# Patient Record
Sex: FEMALE | Race: WHITE | Employment: FULL TIME | URBAN - METROPOLITAN AREA
[De-identification: names, ages, dates, MRNs, and addresses within clinical notes are randomized per-mention and may not be internally consistent; named-entity substitution may affect disease eponyms.]

---

## 2017-03-10 ENCOUNTER — OFFICE VISIT (OUTPATIENT)
Dept: SURGERY | Age: 62
End: 2017-03-10

## 2017-03-10 ENCOUNTER — TELEPHONE (OUTPATIENT)
Dept: SURGERY | Age: 62
End: 2017-03-10

## 2017-03-10 ENCOUNTER — DOCUMENTATION ONLY (OUTPATIENT)
Dept: SURGERY | Age: 62
End: 2017-03-10

## 2017-03-10 VITALS
WEIGHT: 131 LBS | HEART RATE: 71 BPM | SYSTOLIC BLOOD PRESSURE: 122 MMHG | BODY MASS INDEX: 21.05 KG/M2 | DIASTOLIC BLOOD PRESSURE: 61 MMHG | HEIGHT: 66 IN

## 2017-03-10 DIAGNOSIS — N60.12 FIBROCYSTIC BREAST CHANGES OF BOTH BREASTS: Primary | ICD-10-CM

## 2017-03-10 DIAGNOSIS — Z80.3 FAMILY HISTORY OF BREAST CANCER: ICD-10-CM

## 2017-03-10 DIAGNOSIS — N60.11 FIBROCYSTIC BREAST CHANGES OF BOTH BREASTS: Primary | ICD-10-CM

## 2017-03-10 NOTE — PROGRESS NOTES
HISTORY OF PRESENT ILLNESS  Ulises Irby is a 64 y.o. female.   HPI       ROS    Physical Exam    ASSESSMENT and PLAN  {ASSESSMENT/PLAN:19842}

## 2017-03-10 NOTE — PATIENT INSTRUCTIONS
Breast Self-Exam: Care Instructions  Your Care Instructions  A breast self-exam is when you check your breasts for lumps or changes. This regular exam helps you learn how your breasts normally look and feel. Most breast problems or changes are not because of cancer. Breast self-exam is not a substitute for a mammogram. Having regular breast exams by your doctor and regular mammograms improve your chances of finding any problems with your breasts. Some women set a time each month to do a step-by-step breast self-exam. Other women like a less formal system. They might look at their breasts as they brush their teeth, or feel their breasts once in a while in the shower. If you notice a change in your breast, tell your doctor. Follow-up care is a key part of your treatment and safety. Be sure to make and go to all appointments, and call your doctor if you are having problems. Its also a good idea to know your test results and keep a list of the medicines you take. How do you do a breast self-exam?  · The best time to examine your breasts is usually one week after your menstrual period begins. Your breasts should not be tender then. If you do not have periods, you might do your exam on a day of the month that is easy to remember. · To examine your breasts:  ¨ Remove all your clothes above the waist and lie down. When you are lying down, your breast tissue spreads evenly over your chest wall, which makes it easier to feel all your breast tissue. ¨ Use the pads--not the fingertips--of the 3 middle fingers of your left hand to check your right breast. Move your fingers slowly in small coin-sized circles that overlap. ¨ Use three levels of pressure to feel of all your breast tissue. Use light pressure to feel the tissue close to the skin surface. Use medium pressure to feel a little deeper. Use firm pressure to feel your tissue close to your breastbone and ribs.  Use each pressure level to feel your breast tissue before moving on to the next spot. ¨ Check your entire breast, moving up and down as if following a strip from the collarbone to the bra line, and from the armpit to the ribs. Repeat until you have covered the entire breast.  ¨ Repeat this procedure for your left breast, using the pads of the 3 middle fingers of your right hand. · To examine your breasts while in the shower:  ¨ Place one arm over your head and lightly soap your breast on that side. ¨ Using the pads of your fingers, gently move your hand over your breast (in the strip pattern described above), feeling carefully for any lumps or changes. ¨ Repeat for the other breast.  · Have your doctor inspect anything you notice to see if you need further testing. Where can you learn more? Go to http://bambi-gabby.info/. Enter P148 in the search box to learn more about \"Breast Self-Exam: Care Instructions. \"  Current as of: July 26, 2016  Content Version: 11.1  © 6484-6241 [employer]. Care instructions adapted under license by [employer] (which disclaims liability or warranty for this information). If you have questions about a medical condition or this instruction, always ask your healthcare professional. [place] [address] any warranty or liability for your use of this information.

## 2017-03-10 NOTE — PROGRESS NOTES
HISTORY OF PRESENT ILLNESS  Vianca Velez is a 64 y.o. female. HPI  ESTABLISHED patient here today for high risk follow up due to her family history of breast cancer. Denies any breast problems at this time. Patient has had numerous bilateral breast biopsies, all benign (including a right breast fibroadenoma). OB History      Para Term  AB TAB SAB Ectopic Multiple Living    3                 Obstetric Comments    Menarche:14  . LMP: 36.  # of Children:  3. Age at Delivery of First Child:  22.   Hysterectomy/oophorectomy:  YES/NO. Breast Bx:  yes. Hx of Breast Feeding:  yes. BCP:  yes. Hormone therapy:  no.        Past Surgical History:   Procedure Laterality Date    BREAST SURGERY PROCEDURE UNLISTED      fibroadadenoma    HX HYSTERECTOMY      HX TUBAL LIGATION         FH includes-  Sister diagnosed age 43 with breast cancer  at 50  Sister diagnosed with breast cancer age 54, still living (her daughter had genetic testing, which was negative). 3/10/17 - Valeri Bow model - Her 10 year risk is 15.5% (compared with a population risk of 3.6%). Her lifetime risk is 34% (compared with a population risk of 8.7%)    Recent imaging-  Bilateral screening mammogram on 16- BIRADS 2  Breast MRI on 3/8/16- BIRADS 2  ROS    Physical Exam   Constitutional: She is oriented to person, place, and time. She appears well-developed and well-nourished. Pulmonary/Chest: Right breast exhibits skin change (well healed surgical scar). Right breast exhibits no inverted nipple, no mass, no nipple discharge and no tenderness. Left breast exhibits no inverted nipple, no mass, no nipple discharge, no skin change and no tenderness. Breasts are symmetrical.   Musculoskeletal: Normal range of motion. UE x 2   Lymphadenopathy:     She has no cervical adenopathy. She has no axillary adenopathy. Right: No supraclavicular adenopathy present. Left: No supraclavicular adenopathy present. Neurological: She is alert and oriented to person, place, and time. Skin: Skin is warm, dry and intact. Chest and breasts examined   Psychiatric: She has a normal mood and affect. Her speech is normal and behavior is normal.     Visit Vitals    /61    Pulse 71    Ht 5' 6\" (1.676 m)    Wt 131 lb (59.4 kg)    BMI 21.14 kg/m2     ASSESSMENT and PLAN  Encounter Diagnoses   Name Primary?  Fibrocystic breast changes of both breasts Yes    Family history of breast cancer      Normal exam and imaging. We discussed her family history and risk of breast cancer. The patient's risk of breast cancer was calculated using the [address] [place] [address] Street. Her 10 year risk is 15.5% (compared with a population risk of 3.6%). Her lifetime risk is 34% (compared with a population risk of 8.7%). She will continue to have annual mammograms and an annual screening MRI. She will have a CBE with her OB/PCP and will be seen here yearly. She is due for her mammogram and MRI and those were ordered today. Risk reduction with tamoxifen was previously discussed with the patient by Dr. Bianca Briseno and she will not pursue that at this time. We also reviewed genetic testing possibilities focusing on breast cancer causing genes. We discussed possible results (positive for a mutation, variant of unknown significance and negative for a mutation), reliability of these results, what these results mean in terms of her personal risk of breast, ovarian and other cancers, treatment to decrease her risk and/or increased monitoring for cancer detection, effect these results would have on family members and the logistics of testing. All the patient's questions and concerns were addressed and she elected to proceed with genetic testing. We will contact her with the testing results when available.

## 2017-03-10 NOTE — TELEPHONE ENCOUNTER
Please schedule for BSmammogram and screening MRI. High risk patient. Would like mammo and MRI on the same day if possible.

## 2017-03-14 NOTE — TELEPHONE ENCOUNTER
4/18/2017 1400 - 60 min [facility] MRI 1 (Resource) [facility] [employer]

## 2017-03-15 ENCOUNTER — TELEPHONE (OUTPATIENT)
Dept: SURGERY | Age: 62
End: 2017-03-15

## 2017-04-10 DIAGNOSIS — N60.19: Primary | ICD-10-CM

## 2017-04-18 ENCOUNTER — HOSPITAL ENCOUNTER (OUTPATIENT)
Dept: MRI IMAGING | Age: 62
Discharge: HOME | End: 2017-04-18
Attending: NURSE PRACTITIONER
Payer: COMMERCIAL

## 2017-04-18 ENCOUNTER — HOSPITAL ENCOUNTER (OUTPATIENT)
Dept: MAMMOGRAPHY | Age: 62
Discharge: HOME | End: 2017-04-18
Attending: NURSE PRACTITIONER
Payer: COMMERCIAL

## 2017-04-18 DIAGNOSIS — N60.12 FIBROCYSTIC BREAST CHANGES OF BOTH BREASTS: ICD-10-CM

## 2017-04-18 DIAGNOSIS — N60.11 FIBROCYSTIC BREAST CHANGES OF BOTH BREASTS: ICD-10-CM

## 2017-04-18 DIAGNOSIS — Z80.3 FAMILY HISTORY OF BREAST CANCER: ICD-10-CM

## 2017-04-18 PROCEDURE — 74011250636 HC RX REV CODE- 250/636: Performed by: NURSE PRACTITIONER

## 2017-04-18 PROCEDURE — 77067 SCR MAMMO BI INCL CAD: CPT

## 2017-04-18 PROCEDURE — A9585 GADOBUTROL INJECTION: HCPCS | Performed by: NURSE PRACTITIONER

## 2017-04-18 PROCEDURE — 77059 MRI BREAST BI W WO CONT: CPT

## 2017-04-18 RX ADMIN — GADOBUTROL 6 ML: 604.72 INJECTION INTRAVENOUS at 14:13

## 2017-04-19 ENCOUNTER — TELEPHONE (OUTPATIENT)
Dept: SURGERY | Age: 62
End: 2017-04-19

## 2017-04-19 DIAGNOSIS — R92.8 ABNORMAL FINDING ON BREAST IMAGING: Primary | ICD-10-CM

## 2017-04-19 NOTE — TELEPHONE ENCOUNTER
Spoke with patient on the phone. Mammogram was normal, but MRI showed an area of concern on the left breast.  Biopsy recommended and patient amenable to this. Will have office reach out to schedule MRI guided biopsy. Patient will call the office if she has further questions or concerns. Please schedule for left MRI guided biopsy.

## 2017-05-01 ENCOUNTER — TELEPHONE (OUTPATIENT)
Dept: SURGERY | Age: 62
End: 2017-05-01

## 2017-05-01 NOTE — TELEPHONE ENCOUNTER
Received fax from Johnnie stating, again, that sample was insufficient. I called patient. She states she sent in her home sample last week. I told her the information I received stated that it was insufficient. The next option is for her to come in for blood draw. She is ok with this. However, she states she will be out of town for a few days/weeks. I told her to call our office to schedule an appointment for nurse visit. She typically comes to [place]- I told her we are there on fridays. She states understanding. She will call our office when she is ready to schedule. I fax this update back to [facility].

## 2017-05-04 ENCOUNTER — HOSPITAL ENCOUNTER (OUTPATIENT)
Dept: MRI IMAGING | Age: 62
Discharge: HOME | End: 2017-05-04
Attending: NURSE PRACTITIONER
Payer: COMMERCIAL

## 2017-05-04 ENCOUNTER — HOSPITAL ENCOUNTER (OUTPATIENT)
Dept: MAMMOGRAPHY | Age: 62
Discharge: HOME | End: 2017-05-04
Payer: COMMERCIAL

## 2017-05-04 VITALS — BODY MASS INDEX: 21.63 KG/M2 | WEIGHT: 134 LBS

## 2017-05-04 DIAGNOSIS — R92.8 ABNORMAL MRI, BREAST: ICD-10-CM

## 2017-05-04 DIAGNOSIS — R92.8 ABNORMAL FINDING ON BREAST IMAGING: ICD-10-CM

## 2017-05-04 PROCEDURE — 77065 DX MAMMO INCL CAD UNI: CPT

## 2017-05-04 PROCEDURE — A9585 GADOBUTROL INJECTION: HCPCS | Performed by: NURSE PRACTITIONER

## 2017-05-04 PROCEDURE — 74011000250 HC RX REV CODE- 250: Performed by: RADIOLOGY

## 2017-05-04 PROCEDURE — 19085 BX BREAST 1ST LESION MR IMAG: CPT

## 2017-05-04 PROCEDURE — 88342 IMHCHEM/IMCYTCHM 1ST ANTB: CPT | Performed by: RADIOLOGY

## 2017-05-04 PROCEDURE — 74011250636 HC RX REV CODE- 250/636: Performed by: NURSE PRACTITIONER

## 2017-05-04 PROCEDURE — 88305 TISSUE EXAM BY PATHOLOGIST: CPT | Performed by: RADIOLOGY

## 2017-05-04 RX ORDER — LIDOCAINE HYDROCHLORIDE AND EPINEPHRINE 10; 10 MG/ML; UG/ML
10 INJECTION, SOLUTION INFILTRATION; PERINEURAL ONCE
Status: COMPLETED | OUTPATIENT
Start: 2017-05-04 | End: 2017-05-04

## 2017-05-04 RX ORDER — LIDOCAINE HYDROCHLORIDE 10 MG/ML
2 INJECTION INFILTRATION; PERINEURAL ONCE
Status: COMPLETED | OUTPATIENT
Start: 2017-05-04 | End: 2017-05-04

## 2017-05-04 RX ADMIN — LIDOCAINE HYDROCHLORIDE 2 ML: 10 INJECTION, SOLUTION INFILTRATION; PERINEURAL at 13:58

## 2017-05-04 RX ADMIN — GADOBUTROL 6 ML: 604.72 INJECTION INTRAVENOUS at 14:32

## 2017-05-04 RX ADMIN — LIDOCAINE HYDROCHLORIDE,EPINEPHRINE BITARTRATE 100 MG: 10; .01 INJECTION, SOLUTION INFILTRATION; PERINEURAL at 13:58

## 2017-05-04 NOTE — PROGRESS NOTES
1255- IV SL established without problem. Pt tolerated well. Resting on stretcher. 36- Dr. Christine Gunn speaking with pt and answering questions. 1330- Pt amb to MRI room for positioning and pre-procedural scanning. 1358- Time out done. Pt procedure confirmed. Dr. Christine Gunn begins invasive portion of left breast MRI guided biopsy. 1430- Biopsy complete. Pt tolerated procedure well. Specimens obtained and labeled accordingly. Pressure held to biopsy site immediately. 1435- Pt moved to stretcher in recovery area. No active bleeding noted. Resting comfortably. 1440- Pt to mammo area for clip films. 1450- Pt returns. IV D/C'd intact without problem. Bx site dressed with steri-strip, telfa, and hypafix. 6\" ace wrap snugly to chest with ice pack over bx site. D/C instructions reviewed with pt and copy given. Verbalized her understanding. D/C'd amb, nico, NAD. Specimens prepped for  p/u.

## 2017-05-08 ENCOUNTER — TELEPHONE (OUTPATIENT)
Dept: SURGERY | Age: 62
End: 2017-05-08

## 2017-05-08 NOTE — TELEPHONE ENCOUNTER
Spoke with patient on the phone and reviewed pathology - atypia. Will have the office reach out to her to schedule her a presurgical appointment with Dr. Flaca Lindsey. She will also have her blood drawn for genetic testing at that time as her pervious saliva samples have not be adequate for testing. She will contact the office if she has additional questions or concerns.

## 2017-05-09 ENCOUNTER — DOCUMENTATION ONLY (OUTPATIENT)
Dept: SURGERY | Age: 62
End: 2017-05-09

## 2017-05-11 ENCOUNTER — DOCUMENTATION ONLY (OUTPATIENT)
Dept: SURGERY | Age: 62
End: 2017-05-11

## 2017-05-11 NOTE — PROGRESS NOTES
Faxed form to [facility] regarding update with patient coming in on 5/15/17 for blood draw for genetic testing. Confirmation fax received.

## 2017-05-15 ENCOUNTER — OFFICE VISIT (OUTPATIENT)
Dept: SURGERY | Age: 62
End: 2017-05-15

## 2017-05-15 ENCOUNTER — TELEPHONE (OUTPATIENT)
Dept: SURGERY | Age: 62
End: 2017-05-15

## 2017-05-15 VITALS
SYSTOLIC BLOOD PRESSURE: 124 MMHG | BODY MASS INDEX: 21.53 KG/M2 | WEIGHT: 134 LBS | HEIGHT: 66 IN | DIASTOLIC BLOOD PRESSURE: 59 MMHG | HEART RATE: 61 BPM

## 2017-05-15 DIAGNOSIS — Z80.3 FAMILY HISTORY OF BREAST CANCER: ICD-10-CM

## 2017-05-15 DIAGNOSIS — N60.92 ATYPICAL DUCTAL HYPERPLASIA OF LEFT BREAST: Primary | ICD-10-CM

## 2017-05-15 PROBLEM — Z91.89 AT HIGH RISK FOR BREAST CANCER: Status: ACTIVE | Noted: 2017-05-15

## 2017-05-15 RX ORDER — LORATADINE 10 MG/1
10 TABLET ORAL
COMMUNITY

## 2017-05-15 NOTE — PATIENT INSTRUCTIONS
Breast Self-Exam: Care Instructions  Your Care Instructions  A breast self-exam is when you check your breasts for lumps or changes. This regular exam helps you learn how your breasts normally look and feel. Most breast problems or changes are not because of cancer. Breast self-exam is not a substitute for a mammogram. Having regular breast exams by your doctor and regular mammograms improve your chances of finding any problems with your breasts. Some women set a time each month to do a step-by-step breast self-exam. Other women like a less formal system. They might look at their breasts as they brush their teeth, or feel their breasts once in a while in the shower. If you notice a change in your breast, tell your doctor. Follow-up care is a key part of your treatment and safety. Be sure to make and go to all appointments, and call your doctor if you are having problems. Its also a good idea to know your test results and keep a list of the medicines you take. How do you do a breast self-exam?  · The best time to examine your breasts is usually one week after your menstrual period begins. Your breasts should not be tender then. If you do not have periods, you might do your exam on a day of the month that is easy to remember. · To examine your breasts:  ¨ Remove all your clothes above the waist and lie down. When you are lying down, your breast tissue spreads evenly over your chest wall, which makes it easier to feel all your breast tissue. ¨ Use the pads--not the fingertips--of the 3 middle fingers of your left hand to check your right breast. Move your fingers slowly in small coin-sized circles that overlap. ¨ Use three levels of pressure to feel of all your breast tissue. Use light pressure to feel the tissue close to the skin surface. Use medium pressure to feel a little deeper. Use firm pressure to feel your tissue close to your breastbone and ribs.  Use each pressure level to feel your breast tissue before moving on to the next spot. ¨ Check your entire breast, moving up and down as if following a strip from the collarbone to the bra line, and from the armpit to the ribs. Repeat until you have covered the entire breast.  ¨ Repeat this procedure for your left breast, using the pads of the 3 middle fingers of your right hand. · To examine your breasts while in the shower:  ¨ Place one arm over your head and lightly soap your breast on that side. ¨ Using the pads of your fingers, gently move your hand over your breast (in the strip pattern described above), feeling carefully for any lumps or changes. ¨ Repeat for the other breast.  · Have your doctor inspect anything you notice to see if you need further testing. Where can you learn more? Go to http://bambi-gabby.info/. Enter P148 in the search box to learn more about \"Breast Self-Exam: Care Instructions. \"  Current as of: July 26, 2016  Content Version: 11.2  © 0554-6213 [employer]. Care instructions adapted under license by [employer] (which disclaims liability or warranty for this information). If you have questions about a medical condition or this instruction, always ask your healthcare professional. [place] [address] any warranty or liability for your use of this information.

## 2017-05-15 NOTE — PROGRESS NOTES
HISTORY OF PRESENT ILLNESS  Deann Rizzo is a 64 y.o. female. HPI ESTABLISHED patient here for surgical consultation for new LEFT breast ADH. Patient is high risk for breast cancer as her lifetime risk is 34%. Patient also here for blood draw for genetic testing Sly Link), as the saliva sample had inadequate DNA. Breast MRI 17: BI-RADS 4.  Screening mammogram 17: BI-RADS 2. LEFT breast MRI guided biopsy 17 11-12 OC. FINAL PATHOLOGIC DIAGNOSIS   Breast, left, 1112:00, core biopsy:   Atypical ductal hyperplasia involving sclerosing adenosis     FH includes-  Sister diagnosed age 43 with breast cancer  at 50  Sister diagnosed with breast cancer age 54, still living (her daughter had genetic testing, which was negative). 3/10/17 - Tyrer Prettyzick model - Her 10 year risk is 15.5% (compared with a population risk of 3.6%).  Her lifetime risk is 34% (compared with a population risk of 8.7%)    ROS    Physical Exam    ASSESSMENT and PLAN  {ASSESSMENT/PLAN:32510}

## 2017-05-15 NOTE — PROGRESS NOTES
HISTORY OF PRESENT ILLNESS  Jose Daniel Chiang is a 64 y.o. female. HPI  ESTABLISHED patient here for surgical consultation for new LEFT breast ADH. Patient is high risk for breast cancer as her lifetime risk is 34%. Patient also here for blood draw for genetic testing Keisha Sanders), as the saliva sample had inadequate DNA.     Breast MRI 17: BI-RADS 4.  Screening mammogram 17: BI-RADS 2.     LEFT breast MRI guided biopsy 17 11-12 OC. FINAL PATHOLOGIC DIAGNOSIS   Breast, left, 11-12:00, core biopsy:   Atypical ductal hyperplasia involving sclerosing adenosis      FH includes-  Sister diagnosed age 43 with breast cancer  at 50  Sister diagnosed with breast cancer age 54, still living (her daughter had genetic testing, which was negative). 3/10/17 - Rupertoer Prettyzick model - Her 10 year risk is 15.5% (compared with a population risk of 3.6%). Her lifetime risk is 34% (compared with a population risk of 8.7%)    Past Medical History:   Diagnosis Date    Fibrocystic breast     Headache        Past Surgical History:   Procedure Laterality Date    BREAST SURGERY PROCEDURE UNLISTED      fibroadadenoma    HX BREAST BIOPSY Bilateral over years    several stereotactic bx all neg    HX BREAST BIOPSY Right yrs ago    neg; surgical bx    HX HYSTERECTOMY      HX TUBAL LIGATION         Social History     Social History    Marital status:      Spouse name: N/A    Number of children: N/A    Years of education: N/A     Occupational History    Not on file. Social History Main Topics    Smoking status: Never Smoker    Smokeless tobacco: Never Used    Alcohol use No    Drug use: Not on file    Sexual activity: Not on file     Other Topics Concern    Not on file     Social History Narrative       Current Outpatient Prescriptions on File Prior to Visit   Medication Sig Dispense Refill    cycloSPORINE (RESTASIS) 0.05 % ophthalmic emulsion Administer 1 drop to both eyes two (2) times a day.        No current facility-administered medications on file prior to visit. No Known Allergies    OB History      Para Term  AB TAB SAB Ectopic Multiple Living    3                 Obstetric Comments    Menarche:14  . LMP: 36.  # of Children:  3. Age at Delivery of First Child:  22.   Hysterectomy/oophorectomy:  YES/NO. Breast Bx:  yes. Hx of Breast Feeding:  yes. BCP:  yes. Hormone therapy:  no.          ROS  Constitutional: Negative    HENT: Negative. Eyes: Negative. Respiratory: Negative. Cardiovascular: Negative. Gastrointestinal: Negative. Genitourinary: Negative. Musculoskeletal: Negative. Skin: Negative. Neurological: Negative. Endo/Heme/Allergies: Negative. Psychiatric/Behavioral: Negative. Physical Exam   Cardiovascular: Normal rate and normal heart sounds. Pulmonary/Chest: Breath sounds normal. Right breast exhibits no inverted nipple, no mass, no nipple discharge, no skin change and no tenderness. Left breast exhibits no inverted nipple, no mass, no nipple discharge, no skin change and no tenderness. Breasts are symmetrical.       Lymphadenopathy:        Right cervical: No superficial cervical, no deep cervical and no posterior cervical adenopathy present. Left cervical: No superficial cervical, no deep cervical and no posterior cervical adenopathy present. Right axillary: No pectoral and no lateral adenopathy present. Left axillary: No pectoral and no lateral adenopathy present. ASSESSMENT and PLAN    ICD-10-CM ICD-9-CM    1. Atypical ductal hyperplasia of left breast N60.92 610.8    2. Family history of breast cancer Z80.3 V16.3       Pt with recently dx LT breast ADH. We discussed in depth the pathology results and need for surgery with an excisional biopsy. Since pt has 6 bx clips in LT breast, will use needle localization for accuracy. Graeme Bullion blood today for genetic testing since salivary DNA weren't sufficient.      Will schedule LT exbx with needle loc. This plan was reviewed with the patient and patient agrees. All questions were answered.     Written by Coralee Hodgkin, as dictated by Dr. Adela Green MD.

## 2017-05-17 NOTE — COMMUNICATION BODY
HISTORY OF PRESENT ILLNESS  Memo Snider is a 64 y.o. female. HPI  ESTABLISHED patient here for surgical consultation for new LEFT breast ADH. Patient is high risk for breast cancer as her lifetime risk is 34%. Patient also here for blood draw for genetic testing Dustin Rodriguez), as the saliva sample had inadequate DNA.     Breast MRI 17: BI-RADS 4.  Screening mammogram 17: BI-RADS 2.     LEFT breast MRI guided biopsy 17 11-12 OC. FINAL PATHOLOGIC DIAGNOSIS   Breast, left, 11-12:00, core biopsy:   Atypical ductal hyperplasia involving sclerosing adenosis      FH includes-  Sister diagnosed age 43 with breast cancer  at 50  Sister diagnosed with breast cancer age 54, still living (her daughter had genetic testing, which was negative). 3/10/17 - Ibis Henryck model - Her 10 year risk is 15.5% (compared with a population risk of 3.6%). Her lifetime risk is 34% (compared with a population risk of 8.7%)    Past Medical History:   Diagnosis Date    Fibrocystic breast     Headache        Past Surgical History:   Procedure Laterality Date    BREAST SURGERY PROCEDURE UNLISTED      fibroadadenoma    HX BREAST BIOPSY Bilateral over years    several stereotactic bx all neg    HX BREAST BIOPSY Right yrs ago    neg; surgical bx    HX HYSTERECTOMY      HX TUBAL LIGATION         Social History     Social History    Marital status:      Spouse name: N/A    Number of children: N/A    Years of education: N/A     Occupational History    Not on file. Social History Main Topics    Smoking status: Never Smoker    Smokeless tobacco: Never Used    Alcohol use No    Drug use: Not on file    Sexual activity: Not on file     Other Topics Concern    Not on file     Social History Narrative       Current Outpatient Prescriptions on File Prior to Visit   Medication Sig Dispense Refill    cycloSPORINE (RESTASIS) 0.05 % ophthalmic emulsion Administer 1 drop to both eyes two (2) times a day.        No current facility-administered medications on file prior to visit. No Known Allergies    OB History      Para Term  AB TAB SAB Ectopic Multiple Living    3                 Obstetric Comments    Menarche:14  . LMP: 36.  # of Children:  3. Age at Delivery of First Child:  22.   Hysterectomy/oophorectomy:  YES/NO. Breast Bx:  yes. Hx of Breast Feeding:  yes. BCP:  yes. Hormone therapy:  no.          ROS  Constitutional: Negative    HENT: Negative. Eyes: Negative. Respiratory: Negative. Cardiovascular: Negative. Gastrointestinal: Negative. Genitourinary: Negative. Musculoskeletal: Negative. Skin: Negative. Neurological: Negative. Endo/Heme/Allergies: Negative. Psychiatric/Behavioral: Negative. Physical Exam   Cardiovascular: Normal rate and normal heart sounds. Pulmonary/Chest: Breath sounds normal. Right breast exhibits no inverted nipple, no mass, no nipple discharge, no skin change and no tenderness. Left breast exhibits no inverted nipple, no mass, no nipple discharge, no skin change and no tenderness. Breasts are symmetrical.       Lymphadenopathy:        Right cervical: No superficial cervical, no deep cervical and no posterior cervical adenopathy present. Left cervical: No superficial cervical, no deep cervical and no posterior cervical adenopathy present. Right axillary: No pectoral and no lateral adenopathy present. Left axillary: No pectoral and no lateral adenopathy present. ASSESSMENT and PLAN    ICD-10-CM ICD-9-CM    1. Atypical ductal hyperplasia of left breast N60.92 610.8    2. Family history of breast cancer Z80.3 V16.3       Pt with recently dx LT breast ADH. We discussed in depth the pathology results and need for surgery with an excisional biopsy. Since pt has 6 bx clips in LT breast, will use needle localization for accuracy. Licha Comp blood today for genetic testing since salivary DNA weren't sufficient.      Will schedule LT exbx with needle loc. This plan was reviewed with the patient and patient agrees. All questions were answered.     Written by Thi Shin, as dictated by Dr. Branden Man MD.

## 2017-05-18 NOTE — TELEPHONE ENCOUNTER
SURGERY SCHEDULED AT [facility] ON 6-15-17 AT 9 AM; LOC AT 8 AM; PATIENT TO ARRIVE AT 7 AM  PAT AT [facility] ON 6-8-17 AT 2 PM; PATIENT TO ARRIVE AT 1:30 PM  PO APPT WITH DR. OLIVARES AT [facility] ON 7-24-17 AT 10 AM  PATIENT NOTIFIED -[phone] AND GIVEN INSTRUCTIONS.

## 2017-05-26 ENCOUNTER — TELEPHONE (OUTPATIENT)
Dept: SURGERY | Age: 62
End: 2017-05-26

## 2017-05-26 NOTE — TELEPHONE ENCOUNTER
Per Pollo Ramsay NP, I called the patient to let her know her genetic test results were negative. She is happy to hear this. She is scheduled for excisional biopsy on 6/15/17. She has no questions or concerns at this time.

## 2017-06-02 DIAGNOSIS — N60.92 ATYPICAL DUCTAL HYPERPLASIA OF LEFT BREAST: Primary | ICD-10-CM

## 2017-06-08 ENCOUNTER — HOSPITAL ENCOUNTER (OUTPATIENT)
Dept: PREADMISSION TESTING | Age: 62
Discharge: HOME | End: 2017-06-08
Payer: COMMERCIAL

## 2017-06-08 VITALS
HEART RATE: 67 BPM | SYSTOLIC BLOOD PRESSURE: 110 MMHG | HEIGHT: 66 IN | WEIGHT: 133 LBS | RESPIRATION RATE: 18 BRPM | TEMPERATURE: 97.9 F | BODY MASS INDEX: 21.38 KG/M2 | DIASTOLIC BLOOD PRESSURE: 69 MMHG

## 2017-06-08 LAB
ANION GAP BLD CALC-SCNC: 10 MMOL/L (ref 5–15)
ATRIAL RATE: 67 BPM
BASOPHILS # BLD AUTO: 0.1 K/UL (ref 0–0.1)
BASOPHILS # BLD: 1 % (ref 0–1)
BUN SERPL-MCNC: 16 MG/DL (ref 6–20)
BUN/CREAT SERPL: 16 (ref 12–20)
CALCIUM SERPL-MCNC: 9.9 MG/DL (ref 8.5–10.1)
CALCULATED P AXIS, ECG09: 55 DEGREES
CALCULATED R AXIS, ECG10: 84 DEGREES
CALCULATED T AXIS, ECG11: 32 DEGREES
CHLORIDE SERPL-SCNC: 105 MMOL/L (ref 97–108)
CO2 SERPL-SCNC: 29 MMOL/L (ref 21–32)
CREAT SERPL-MCNC: 0.99 MG/DL (ref 0.55–1.02)
DIAGNOSIS, 93000: NORMAL
EOSINOPHIL # BLD: 0.1 K/UL (ref 0–0.4)
EOSINOPHIL NFR BLD: 2 % (ref 0–7)
ERYTHROCYTE [DISTWIDTH] IN BLOOD BY AUTOMATED COUNT: 13.1 % (ref 11.5–14.5)
GLUCOSE SERPL-MCNC: 109 MG/DL (ref 65–100)
HCT VFR BLD AUTO: 39.5 % (ref 35–47)
HGB BLD-MCNC: 13 G/DL (ref 11.5–16)
LYMPHOCYTES # BLD AUTO: 36 % (ref 12–49)
LYMPHOCYTES # BLD: 2 K/UL (ref 0.8–3.5)
MCH RBC QN AUTO: 29.7 PG (ref 26–34)
MCHC RBC AUTO-ENTMCNC: 32.9 G/DL (ref 30–36.5)
MCV RBC AUTO: 90.4 FL (ref 80–99)
MONOCYTES # BLD: 0.3 K/UL (ref 0–1)
MONOCYTES NFR BLD AUTO: 5 % (ref 5–13)
NEUTS SEG # BLD: 3.1 K/UL (ref 1.8–8)
NEUTS SEG NFR BLD AUTO: 56 % (ref 32–75)
P-R INTERVAL, ECG05: 164 MS
PLATELET # BLD AUTO: 176 K/UL (ref 150–400)
POTASSIUM SERPL-SCNC: 3.8 MMOL/L (ref 3.5–5.1)
Q-T INTERVAL, ECG07: 408 MS
QRS DURATION, ECG06: 96 MS
QTC CALCULATION (BEZET), ECG08: 431 MS
RBC # BLD AUTO: 4.37 M/UL (ref 3.8–5.2)
SODIUM SERPL-SCNC: 144 MMOL/L (ref 136–145)
VENTRICULAR RATE, ECG03: 67 BPM
WBC # BLD AUTO: 5.5 K/UL (ref 3.6–11)

## 2017-06-08 PROCEDURE — 85025 COMPLETE CBC W/AUTO DIFF WBC: CPT | Performed by: SURGERY

## 2017-06-08 PROCEDURE — 36415 COLL VENOUS BLD VENIPUNCTURE: CPT | Performed by: SURGERY

## 2017-06-08 PROCEDURE — 93005 ELECTROCARDIOGRAM TRACING: CPT

## 2017-06-08 PROCEDURE — 80048 BASIC METABOLIC PNL TOTAL CA: CPT | Performed by: SURGERY

## 2017-06-08 RX ORDER — SIMETHICONE 80 MG
80 TABLET,CHEWABLE ORAL AS NEEDED
COMMUNITY
End: 2018-04-20

## 2017-06-08 NOTE — PERIOP NOTES
PATIENT GIVEN SURGICAL SITE INFECTION FAQS INFORMATION HANDOUT. PT HAS BEEN GIVEN THE OPPORTUNITY TO ASK ADDITIONAL QUESTIONS.     WIPES AND DIRECTIONS GIVEN TO PT

## 2017-06-14 ENCOUNTER — ANESTHESIA EVENT (OUTPATIENT)
Dept: MEDSURG UNIT | Age: 62
End: 2017-06-14
Payer: COMMERCIAL

## 2017-06-15 ENCOUNTER — HOSPITAL ENCOUNTER (OUTPATIENT)
Age: 62
Setting detail: OUTPATIENT SURGERY
Discharge: HOME | End: 2017-06-15
Attending: SURGERY | Admitting: SURGERY
Payer: COMMERCIAL

## 2017-06-15 ENCOUNTER — APPOINTMENT (OUTPATIENT)
Dept: MAMMOGRAPHY | Age: 62
End: 2017-06-15
Attending: SURGERY
Payer: COMMERCIAL

## 2017-06-15 ENCOUNTER — ANESTHESIA (OUTPATIENT)
Dept: MEDSURG UNIT | Age: 62
End: 2017-06-15
Payer: COMMERCIAL

## 2017-06-15 VITALS
TEMPERATURE: 97.4 F | DIASTOLIC BLOOD PRESSURE: 73 MMHG | RESPIRATION RATE: 16 BRPM | SYSTOLIC BLOOD PRESSURE: 124 MMHG | BODY MASS INDEX: 21.38 KG/M2 | HEART RATE: 78 BPM | WEIGHT: 133 LBS | OXYGEN SATURATION: 96 % | HEIGHT: 66 IN

## 2017-06-15 DIAGNOSIS — N63.20 MASS OF LEFT BREAST: ICD-10-CM

## 2017-06-15 DIAGNOSIS — N60.92 ATYPICAL DUCTAL HYPERPLASIA OF LEFT BREAST: ICD-10-CM

## 2017-06-15 PROCEDURE — 77030018836 HC SOL IRR NACL ICUM -A: Performed by: SURGERY

## 2017-06-15 PROCEDURE — 77030011640 HC PAD GRND REM COVD -A: Performed by: SURGERY

## 2017-06-15 PROCEDURE — 88307 TISSUE EXAM BY PATHOLOGIST: CPT | Performed by: SURGERY

## 2017-06-15 PROCEDURE — 19281 PERQ DEVICE BREAST 1ST IMAG: CPT

## 2017-06-15 PROCEDURE — 77030019908 HC STETH ESOPH SIMS -A: Performed by: ANESTHESIOLOGY

## 2017-06-15 PROCEDURE — 76210000035 HC AMBSU PH I REC 1 TO 1.5 HR: Performed by: SURGERY

## 2017-06-15 PROCEDURE — 77030011267 HC ELECTRD BLD COVD -A: Performed by: SURGERY

## 2017-06-15 PROCEDURE — 77030002933 HC SUT MCRYL J&J -A: Performed by: SURGERY

## 2017-06-15 PROCEDURE — 77030003594 MAM PLC NDL/WIRE/CLIP/SEED BREAST LT

## 2017-06-15 PROCEDURE — 76030000001 HC AMB SURG OR TIME 1 TO 1.5: Performed by: SURGERY

## 2017-06-15 PROCEDURE — 77030010507 HC ADH SKN DERMBND J&J -B: Performed by: SURGERY

## 2017-06-15 PROCEDURE — 77030031139 HC SUT VCRL2 J&J -A: Performed by: SURGERY

## 2017-06-15 PROCEDURE — 77030002996 HC SUT SLK J&J -A: Performed by: SURGERY

## 2017-06-15 PROCEDURE — 74011250636 HC RX REV CODE- 250/636: Performed by: ANESTHESIOLOGY

## 2017-06-15 PROCEDURE — 77030020782 HC GWN BAIR PAWS FLX 3M -B

## 2017-06-15 PROCEDURE — 74011000258 HC RX REV CODE- 258: Performed by: SURGERY

## 2017-06-15 PROCEDURE — 74011000250 HC RX REV CODE- 250

## 2017-06-15 PROCEDURE — 76060000062 HC AMB SURG ANES 1 TO 1.5 HR: Performed by: SURGERY

## 2017-06-15 PROCEDURE — 74011000250 HC RX REV CODE- 250: Performed by: SURGERY

## 2017-06-15 PROCEDURE — 76210000050 HC AMBSU PH II REC 0.5 TO 1 HR: Performed by: SURGERY

## 2017-06-15 PROCEDURE — 88342 IMHCHEM/IMCYTCHM 1ST ANTB: CPT | Performed by: SURGERY

## 2017-06-15 PROCEDURE — 74011250636 HC RX REV CODE- 250/636

## 2017-06-15 PROCEDURE — 77030010509 HC AIRWY LMA MSK TELE -A: Performed by: ANESTHESIOLOGY

## 2017-06-15 PROCEDURE — 77030032490 HC SLV COMPR SCD KNE COVD -B: Performed by: SURGERY

## 2017-06-15 PROCEDURE — 74011000250 HC RX REV CODE- 250: Performed by: RADIOLOGY

## 2017-06-15 RX ORDER — KETOROLAC TROMETHAMINE 30 MG/ML
INJECTION, SOLUTION INTRAMUSCULAR; INTRAVENOUS AS NEEDED
Status: DISCONTINUED | OUTPATIENT
Start: 2017-06-15 | End: 2017-06-15 | Stop reason: HOSPADM

## 2017-06-15 RX ORDER — ONDANSETRON 2 MG/ML
4 INJECTION INTRAMUSCULAR; INTRAVENOUS AS NEEDED
Status: DISCONTINUED | OUTPATIENT
Start: 2017-06-15 | End: 2017-06-15 | Stop reason: HOSPADM

## 2017-06-15 RX ORDER — SODIUM CHLORIDE 0.9 % (FLUSH) 0.9 %
5-10 SYRINGE (ML) INJECTION AS NEEDED
Status: DISCONTINUED | OUTPATIENT
Start: 2017-06-15 | End: 2017-06-15 | Stop reason: HOSPADM

## 2017-06-15 RX ORDER — SODIUM CHLORIDE 0.9 % (FLUSH) 0.9 %
5-10 SYRINGE (ML) INJECTION EVERY 8 HOURS
Status: DISCONTINUED | OUTPATIENT
Start: 2017-06-15 | End: 2017-06-15 | Stop reason: HOSPADM

## 2017-06-15 RX ORDER — BUPIVACAINE HYDROCHLORIDE AND EPINEPHRINE 5; 5 MG/ML; UG/ML
30 INJECTION, SOLUTION EPIDURAL; INTRACAUDAL; PERINEURAL ONCE
Status: CANCELLED | OUTPATIENT
Start: 2017-06-15 | End: 2017-06-15

## 2017-06-15 RX ORDER — LIDOCAINE HYDROCHLORIDE 10 MG/ML
0.1 INJECTION, SOLUTION EPIDURAL; INFILTRATION; INTRACAUDAL; PERINEURAL AS NEEDED
Status: DISCONTINUED | OUTPATIENT
Start: 2017-06-15 | End: 2017-06-15 | Stop reason: HOSPADM

## 2017-06-15 RX ORDER — ONDANSETRON 2 MG/ML
INJECTION INTRAMUSCULAR; INTRAVENOUS AS NEEDED
Status: DISCONTINUED | OUTPATIENT
Start: 2017-06-15 | End: 2017-06-15 | Stop reason: HOSPADM

## 2017-06-15 RX ORDER — FENTANYL CITRATE 50 UG/ML
25 INJECTION, SOLUTION INTRAMUSCULAR; INTRAVENOUS
Status: DISCONTINUED | OUTPATIENT
Start: 2017-06-15 | End: 2017-06-15 | Stop reason: HOSPADM

## 2017-06-15 RX ORDER — SODIUM CHLORIDE, SODIUM LACTATE, POTASSIUM CHLORIDE, CALCIUM CHLORIDE 600; 310; 30; 20 MG/100ML; MG/100ML; MG/100ML; MG/100ML
50 INJECTION, SOLUTION INTRAVENOUS CONTINUOUS
Status: DISCONTINUED | OUTPATIENT
Start: 2017-06-15 | End: 2017-06-15 | Stop reason: HOSPADM

## 2017-06-15 RX ORDER — LIDOCAINE HYDROCHLORIDE 10 MG/ML
8 INJECTION INFILTRATION; PERINEURAL
Status: COMPLETED | OUTPATIENT
Start: 2017-06-15 | End: 2017-06-15

## 2017-06-15 RX ORDER — DEXAMETHASONE SODIUM PHOSPHATE 4 MG/ML
INJECTION, SOLUTION INTRA-ARTICULAR; INTRALESIONAL; INTRAMUSCULAR; INTRAVENOUS; SOFT TISSUE AS NEEDED
Status: DISCONTINUED | OUTPATIENT
Start: 2017-06-15 | End: 2017-06-15 | Stop reason: HOSPADM

## 2017-06-15 RX ORDER — LIDOCAINE HYDROCHLORIDE AND EPINEPHRINE 10; 10 MG/ML; UG/ML
30 INJECTION, SOLUTION INFILTRATION; PERINEURAL ONCE
Status: CANCELLED | OUTPATIENT
Start: 2017-06-15 | End: 2017-06-15

## 2017-06-15 RX ORDER — HYDROCODONE BITARTRATE AND ACETAMINOPHEN 7.5; 325 MG/1; MG/1
1 TABLET ORAL
Qty: 35 TAB | Refills: 0 | Status: SHIPPED | OUTPATIENT
Start: 2017-06-15 | End: 2017-07-24

## 2017-06-15 RX ORDER — FENTANYL CITRATE 50 UG/ML
INJECTION, SOLUTION INTRAMUSCULAR; INTRAVENOUS AS NEEDED
Status: DISCONTINUED | OUTPATIENT
Start: 2017-06-15 | End: 2017-06-15 | Stop reason: HOSPADM

## 2017-06-15 RX ORDER — HYDROMORPHONE HYDROCHLORIDE 1 MG/ML
0.2 INJECTION, SOLUTION INTRAMUSCULAR; INTRAVENOUS; SUBCUTANEOUS
Status: DISCONTINUED | OUTPATIENT
Start: 2017-06-15 | End: 2017-06-15 | Stop reason: HOSPADM

## 2017-06-15 RX ORDER — MORPHINE SULFATE 10 MG/ML
2 INJECTION, SOLUTION INTRAMUSCULAR; INTRAVENOUS
Status: DISCONTINUED | OUTPATIENT
Start: 2017-06-15 | End: 2017-06-15 | Stop reason: HOSPADM

## 2017-06-15 RX ORDER — LIDOCAINE HYDROCHLORIDE 20 MG/ML
INJECTION, SOLUTION EPIDURAL; INFILTRATION; INTRACAUDAL; PERINEURAL AS NEEDED
Status: DISCONTINUED | OUTPATIENT
Start: 2017-06-15 | End: 2017-06-15 | Stop reason: HOSPADM

## 2017-06-15 RX ORDER — MIDAZOLAM HYDROCHLORIDE 1 MG/ML
INJECTION, SOLUTION INTRAMUSCULAR; INTRAVENOUS AS NEEDED
Status: DISCONTINUED | OUTPATIENT
Start: 2017-06-15 | End: 2017-06-15 | Stop reason: HOSPADM

## 2017-06-15 RX ORDER — PROPOFOL 10 MG/ML
INJECTION, EMULSION INTRAVENOUS AS NEEDED
Status: DISCONTINUED | OUTPATIENT
Start: 2017-06-15 | End: 2017-06-15 | Stop reason: HOSPADM

## 2017-06-15 RX ADMIN — LIDOCAINE HYDROCHLORIDE 8 ML: 10 INJECTION, SOLUTION INFILTRATION; PERINEURAL at 11:55

## 2017-06-15 RX ADMIN — ONDANSETRON 4 MG: 2 INJECTION INTRAMUSCULAR; INTRAVENOUS at 15:19

## 2017-06-15 RX ADMIN — KETOROLAC TROMETHAMINE 30 MG: 30 INJECTION, SOLUTION INTRAMUSCULAR; INTRAVENOUS at 15:50

## 2017-06-15 RX ADMIN — DEXAMETHASONE SODIUM PHOSPHATE 4 MG: 4 INJECTION, SOLUTION INTRA-ARTICULAR; INTRALESIONAL; INTRAMUSCULAR; INTRAVENOUS; SOFT TISSUE at 15:19

## 2017-06-15 RX ADMIN — SODIUM BICARBONATE 2 ML: 0.2 INJECTION, SOLUTION INTRAVENOUS at 11:55

## 2017-06-15 RX ADMIN — FENTANYL CITRATE 25 MCG: 50 INJECTION, SOLUTION INTRAMUSCULAR; INTRAVENOUS at 15:32

## 2017-06-15 RX ADMIN — FENTANYL CITRATE 50 MCG: 50 INJECTION, SOLUTION INTRAMUSCULAR; INTRAVENOUS at 15:07

## 2017-06-15 RX ADMIN — SODIUM CHLORIDE, SODIUM LACTATE, POTASSIUM CHLORIDE, AND CALCIUM CHLORIDE 50 ML/HR: 600; 310; 30; 20 INJECTION, SOLUTION INTRAVENOUS at 13:54

## 2017-06-15 RX ADMIN — FENTANYL CITRATE 25 MCG: 50 INJECTION, SOLUTION INTRAMUSCULAR; INTRAVENOUS at 15:49

## 2017-06-15 RX ADMIN — MIDAZOLAM HYDROCHLORIDE 2 MG: 1 INJECTION, SOLUTION INTRAMUSCULAR; INTRAVENOUS at 15:03

## 2017-06-15 RX ADMIN — PROPOFOL 100 MG: 10 INJECTION, EMULSION INTRAVENOUS at 15:07

## 2017-06-15 RX ADMIN — LIDOCAINE HYDROCHLORIDE 80 MG: 20 INJECTION, SOLUTION EPIDURAL; INFILTRATION; INTRACAUDAL; PERINEURAL at 15:07

## 2017-06-15 NOTE — ROUTINE PROCESS
Patient: Jenelle Beasley MRN: [id number]  SSN: [id number]   YOB: 1955  Age: 64 y.o. Sex: female     Patient is status post Procedure(s):  LEFT BREAST EXCISIONAL BIOPSY WITH NEEDLE LOCALIZATION .     Surgeon(s) and Role:     * Stan Partida MD - Primary    Local/Dose/Irrigation:  See [facility]                Peripheral IV 06/15/17 Right Forearm (Active)   Dressing Status Clean, dry, & intact 6/15/2017  1:53 PM   Dressing Type Transparent 6/15/2017  1:53 PM                           Dressing/Packing:     Splint/Cast:  ]    Other:

## 2017-06-15 NOTE — DISCHARGE INSTRUCTIONS
Discharge Instructions from Dr. Timbo Jung    · I will call you with the pathology results, typically within 1 week from today. · You may shower, but no hot tubs, swimming pools, or baths until your incision is healed. · No heavy lifting with the affected extremity (nothing greater than 5 pounds), and limit its use for the next 4-5 days. · You may use an ice pack for comfort for the next couple of days  · Follow medication instructions carefully. · Watch for signs of infection as listed below. · Redness  · Swelling  · Drainage from the incision or from your nipple that appears infected  · Fever over 101 degrees for consecutive readings, or over 99.5 if you are currently undergoing chemotherapy. · Call our office (number is below) for a follow-up appointment. · If you have any problems, our phone number is [phone]. DISCHARGE SUMMARY from Nurse    The following personal items are in your possession at time of discharge:    Dental Appliances: None              Clothing:  (clothes in locker) worn home                  PATIENT INSTRUCTIONS:    After general anesthesia or intravenous sedation, for 24 hours or while taking prescription Narcotics:  · Limit your activities  · Do not drive and operate hazardous machinery  · Do not make important personal or business decisions  · Do  not drink alcoholic beverages  · If you have not urinated within 8 hours after discharge, please contact your surgeon on call.     Report the following to your surgeon:  · Excessive pain, swelling, redness or odor of or around the surgical area  · Temperature over 100.5  · Nausea and vomiting lasting longer than 4 hours or if unable to take medications  · Any signs of decreased circulation or nerve impairment to extremity: change in color, persistent  numbness, tingling, coldness or increase pain  · Any questions        What to do at Home:  Recommended activity: Activity as tolerated and no driving for today    If you experience any of the following symptoms as aforementioned, please follow up with Génesis Baez. *  Please give a list of your current medications to your Primary Care Provider. *  Please update this list whenever your medications are discontinued, doses are      changed, or new medications (including over-the-counter products) are added. *  Please carry medication information at all times in case of emergency situations. These are general instructions for a healthy lifestyle:    No smoking/ No tobacco products/ Avoid exposure to second hand smoke    Surgeon General's Warning:  Quitting smoking now greatly reduces serious risk to your health. Obesity, smoking, and sedentary lifestyle greatly increases your risk for illness    A healthy diet, regular physical exercise & weight monitoring are important for maintaining a healthy lifestyle    You may be retaining fluid if you have a history of heart failure or if you experience any of the following symptoms:  Weight gain of 3 pounds or more overnight or 5 pounds in a week, increased swelling in our hands or feet or shortness of breath while lying flat in bed. Please call your doctor as soon as you notice any of these symptoms; do not wait until your next office visit. Recognize signs and symptoms of STROKE:    F-face looks uneven    A-arms unable to move or move unevenly    S-speech slurred or non-existent    T-time-call 911 as soon as signs and symptoms begin-DO NOT go       Back to bed or wait to see if you get better-TIME IS BRAIN. Warning Signs of HEART ATTACK     Call 911 if you have these symptoms:   Chest discomfort. Most heart attacks involve discomfort in the center of the chest that lasts more than a few minutes, or that goes away and comes back. It can feel like uncomfortable pressure, squeezing, fullness, or pain.  Discomfort in other areas of the upper body.  Symptoms can include pain or discomfort in one or both arms, the back, neck, jaw, or stomach.  Shortness of breath with or without chest discomfort.  Other signs may include breaking out in a cold sweat, nausea, or lightheadedness. Don't wait more than five minutes to call 911 - MINUTES MATTER! Fast action can save your life. Calling 911 is almost always the fastest way to get lifesaving treatment. Emergency Medical Services staff can begin treatment when they arrive -- up to an hour sooner than if someone gets to the hospital by car. The discharge information has been reviewed with the patient and spouse. The patient and spouse verbalized understanding. Discharge medications reviewed with the patient and spouse and appropriate educational materials and side effects teaching were provided.

## 2017-06-15 NOTE — IP AVS SNAPSHOT
Summary of Care Report The Summary of Care report has been created to help improve care coordination. Users with access to [employer] or [address] (Web-based application) may access additional patient information including the Discharge Summary. If you are not currently a [address] user and need more information, please call the number listed below in the [place] [address] section and ask to be connected with Medical Records. Facility Information Name Address Phone [address] [facility] 7 [phone] Patient Information Patient Name Sex MARIO Beasley ([id number]) Female 1955 Discharge Information Admitting Provider Service Area Unit Danny Cornell MD / [address] [place] [address] Drive / [phone] Discharge Provider Discharge Date/Time Discharge Disposition Destination (none) 6/15/2017 (Pending) AHR (none) Patient Language Language ENGLISH [13] Hospital Problems as of 6/15/2017  Reviewed: 2017 11:40 AM by Danny Cornell MD  
 None Non-Hospital Problems as of 6/15/2017  Reviewed: 2017 11:40 AM by Danny Cornell MD  
  
  
  
 Class Noted - Resolved Last Modified Active Problems Family history of breast cancer  12/15/2014 - Present 5/15/2017 by Lyly Blake Entered by Danny Cornell MD  
  Overview Addendum 5/15/2017 10:35 AM by Lyly Blake Sister diagnosed age 43 with breast cancer  at 50 Sister diagnosed with breast cancer age 54, still living (her daughter had genetic testing, which was negative). Fibrocystic disease of breast  12/15/2014 - Present 6801 by Danny Cornell MD  
  Entered by Danny Cornell MD  
  At high risk for breast cancer  5/15/2017 - Present 5/15/2017 by Lyly Blake Entered by Cm Linda Overview Signed 5/15/2017  8:29 AM by Cm Linda Lifetime risk is 34% ([employer]) You are allergic to the following Allergen Reactions Percocet (Oxycodone-Acetaminophen) Other (comments) \"I FELT LIKE I WAS SPIRALING DOWN AND HAD HORRIBLE NIGHTMARES\" Current Discharge Medication List  
  
START taking these medications Dose & Instructions Dispensing Information Comments HYDROcodone-acetaminophen 7.5-325 mg per tablet Commonly known as:  Sabattus Hurt Dose:  1 Tab Take 1 Tab by mouth every four (4) hours as needed for Pain. Max Daily Amount: 6 Tabs. Quantity:  35 Tab Refills:  0 CONTINUE these medications which have NOT CHANGED Dose & Instructions Dispensing Information Comments ADVIL 100 mg tablet Generic drug:  ibuprofen Dose:  600 mg Take 600 mg by mouth every six (6) hours as needed for Pain. Refills:  0  
   
 GAS-X 80 mg chewable tablet Generic drug:  simethicone Dose:  80 mg Take 80 mg by mouth as needed for Flatulence. Refills:  0  
   
 loratadine 10 mg tablet Commonly known as:  Mariah Amble Dose:  10 mg Take 10 mg by mouth daily as needed. Refills:  0  
   
 RESTASIS 0.05 % ophthalmic emulsion Generic drug:  cycloSPORINE Dose:  1 Drop Administer 1 drop to both eyes two (2) times a day. Refills:  0 Surgery Information ID Date/Time Status Primary Surgeon All Procedures Location [id number] 6/15/2017 1344 Unposted Reyes Aguirre MD LEFT BREAST EXCISIONAL BIOPSY WITH NEEDLE LOCALIZATION  [facility] AMBULATORY OR Follow-up Information Follow up With Details Comments Contact Info None   None (395) Patient stated that they have no PCP Discharge Instructions Discharge Instructions from Dr. Annamarie Morataya · I will call you with the pathology results, typically within 1 week from today. · You may shower, but no hot tubs, swimming pools, or baths until your incision is healed. · No heavy lifting with the affected extremity (nothing greater than 5 pounds), and limit its use for the next 4-5 days. · You may use an ice pack for comfort for the next couple of days · Follow medication instructions carefully. · Watch for signs of infection as listed below. · Redness · Swelling · Drainage from the incision or from your nipple that appears infected · Fever over 101 degrees for consecutive readings, or over 99.5 if you are currently undergoing chemotherapy. · Call our office (number is below) for a follow-up appointment. · If you have any problems, our phone number is [phone]. DISCHARGE SUMMARY from Nurse The following personal items are in your possession at time of discharge: 
 
Dental Appliances: None Clothing:  (clothes in locker) worn home PATIENT INSTRUCTIONS: 
 
 
F-face looks uneven A-arms unable to move or move unevenly S-speech slurred or non-existent T-time-call 911 as soon as signs and symptoms begin-DO NOT go Back to bed or wait to see if you get better-TIME IS BRAIN. Warning Signs of HEART ATTACK Call 911 if you have these symptoms: 
? Chest discomfort. Most heart attacks involve discomfort in the center of the chest that lasts more than a few minutes, or that goes away and comes back. It can feel like uncomfortable pressure, squeezing, fullness, or pain. ? Discomfort in other areas of the upper body. Symptoms can include pain or discomfort in one or both arms, the back, neck, jaw, or stomach. ? Shortness of breath with or without chest discomfort. ? Other signs may include breaking out in a cold sweat, nausea, or lightheadedness. Don't wait more than five minutes to call [address]! Fast action can save your life. Calling 911 is almost always the fastest way to get lifesaving treatment. Emergency Medical Services staff can begin treatment when they arrive  up to an hour sooner than if someone gets to the hospital by car. The discharge information has been reviewed with the patient and spouse. The patient and spouse verbalized understanding. Discharge medications reviewed with the patient and spouse and appropriate educational materials and side effects teaching were provided. Chart Review Routing History Recipient Method Report Sent By Crissie Sever Paddy Poland., MD  
Phone: [phone] In [employer] IP Auto Routed Trans Stan Partida MD [[id number]] 6/15/2017  4:24 PM 06/15/2017 Carmel Zhong MD  
Fax: [phone] Phone: [phone] Fax IP Auto Routed Trans Stan Partida MD [[id number]] 6/15/2017  4:24 PM 06/15/2017

## 2017-06-15 NOTE — H&P
HISTORY OF PRESENT ILLNESS  Aileen Nicolas is a 64 y.o. female. HPI  ESTABLISHED patient here for surgical consultation for new LEFT breast ADH. Patient is high risk for breast cancer as her lifetime risk is 34%. Patient also here for blood draw for genetic testing Benetta Riedel), as the saliva sample had inadequate DNA.      Breast MRI 17: BI-RADS 4.  Screening mammogram 17: BI-RADS 2.      LEFT breast MRI guided biopsy 17 11-12 OC. FINAL PATHOLOGIC DIAGNOSIS   Breast, left, 11-12:00, core biopsy:   Atypical ductal hyperplasia involving sclerosing adenosis       FH includes-  Sister diagnosed age 43 with breast cancer  at 50  Sister diagnosed with breast cancer age 54, still living (her daughter had genetic testing, which was negative). 3/10/17 - Ibis [employer] model - Her 10 year risk is 15.5% (compared with a population risk of 3.6%).  Her lifetime risk is 34% (compared with a population risk of 8.7%)     Past Medical History:   Diagnosis Date    Fibrocystic breast      Headache                 Past Surgical History:   Procedure Laterality Date    BREAST SURGERY PROCEDURE UNLISTED        fibroadadenoma    HX BREAST BIOPSY Bilateral over years     several stereotactic bx all neg    HX BREAST BIOPSY Right yrs ago     neg; surgical bx    HX HYSTERECTOMY        HX TUBAL LIGATION             Social History            Social History    Marital status:        Spouse name: N/A    Number of children: N/A    Years of education: N/A          Occupational History    Not on file.           Social History Main Topics    Smoking status: Never Smoker    Smokeless tobacco: Never Used    Alcohol use No    Drug use: Not on file    Sexual activity: Not on file           Other Topics Concern    Not on file      Social History Narrative                Current Outpatient Prescriptions on File Prior to Visit   Medication Sig Dispense Refill    cycloSPORINE (RESTASIS) 0.05 % ophthalmic emulsion Administer 1 drop to both eyes two (2) times a day.          No current facility-administered medications on file prior to visit.          No Known Allergies     OB History      Para Term  AB TAB SAB Ectopic Multiple Living     3                           Obstetric Comments     Menarche:14 . LMP: 36. # of Children: 3. Age at Delivery of First Child: 22. Hysterectomy/oophorectomy: YES/NO. Breast Bx: yes. Hx of Breast Feeding: yes. BCP: yes. Hormone therapy: no.            ROS  Constitutional: Negative   HENT: Negative. Eyes: Negative. Respiratory: Negative. Cardiovascular: Negative. Gastrointestinal: Negative. Genitourinary: Negative. Musculoskeletal: Negative. Skin: Negative. Neurological: Negative. Endo/Heme/Allergies: Negative. Psychiatric/Behavioral: Negative.     Physical Exam   Cardiovascular: Normal rate and normal heart sounds. Pulmonary/Chest: Breath sounds normal. Right breast exhibits no inverted nipple, no mass, no nipple discharge, no skin change and no tenderness. Left breast exhibits no inverted nipple, no mass, no nipple discharge, no skin change and no tenderness. Breasts are symmetrical.       Lymphadenopathy:   Right cervical: No superficial cervical, no deep cervical and no posterior cervical adenopathy present. Left cervical: No superficial cervical, no deep cervical and no posterior cervical adenopathy present. Right axillary: No pectoral and no lateral adenopathy present. Left axillary: No pectoral and no lateral adenopathy present.     ASSESSMENT and PLAN      ICD-10-CM ICD-9-CM     1. Atypical ductal hyperplasia of left breast N60.92 610.8     2. Family history of breast cancer Z80.3 V16.3        Pt with recently dx LT breast ADH. We discussed in depth the pathology results and need for surgery with an excisional biopsy. Since pt has 6 bx clips in LT breast, will use needle localization for accuracy.  Neeta Dominguez blood today for genetic testing since salivary DNA weren't sufficient.      Will schedule LT exbx with needle loc. This plan was reviewed with the patient and patient agrees.  All questions were answered.

## 2017-06-15 NOTE — IP AVS SNAPSHOT
[address] 
[phone] Patient: Jenelle Beasley MRN: [id number] GAQ:[birth year] You are allergic to the following Allergen Reactions Percocet (Oxycodone-Acetaminophen) Other (comments) \"I FELT LIKE I WAS SPIRALING DOWN AND HAD HORRIBLE NIGHTMARES\" Recent Documentation Height Weight BMI OB Status Smoking Status 1.676 m 60.3 kg 21.47 kg/m2 Hysterectomy Never Smoker Emergency Contacts Name Discharge Info Relation Home Work Mobile Mihir Soliz DISCHARGE CAREGIVER [3] Spouse [3] [phone] About your hospitalization You were admitted on:  Mandi 15, 2017 You last received care in the:  [facility] ASU PACU You were discharged on:  Mandi 15, 2017 Unit phone number:  [phone] Why you were hospitalized Your primary diagnosis was:  Not on File Providers Seen During Your Hospitalizations Provider Role Specialty Primary office phone Stan Partida MD Attending Provider Breast Surgery [phone] Your Primary Care Physician (PCP) Primary Care Physician Office Phone Office Fax NONE ** None ** ** None ** Follow-up Information Follow up With Details Comments Contact Info None   None (395) Patient stated that they have no PCP Your Appointments Monday July 24, 2017 10:00 AM EDT  
POST OP with Stan Partida MD  
[address] [facility] [address] [phone] Current Discharge Medication List  
  
START taking these medications Dose & Instructions Dispensing Information Comments Morning Noon Evening Bedtime HYDROcodone-acetaminophen 7.5-325 mg per tablet Commonly known as:  Jarome Lupe Your last dose was: Your next dose is:    
   
   
 Dose:  1 Tab Take 1 Tab by mouth every four (4) hours as needed for Pain. Max Daily Amount: 6 Tabs. Quantity:  35 Tab Refills:  0 CONTINUE these medications which have NOT CHANGED Dose & Instructions Dispensing Information Comments Morning Noon Evening Bedtime ADVIL 100 mg tablet Generic drug:  ibuprofen Your last dose was: Your next dose is:    
   
   
 Dose:  600 mg Take 600 mg by mouth every six (6) hours as needed for Pain. Refills:  0  
     
   
   
   
  
 GAS-X 80 mg chewable tablet Generic drug:  simethicone Your last dose was: Your next dose is:    
   
   
 Dose:  80 mg Take 80 mg by mouth as needed for Flatulence. Refills:  0  
     
   
   
   
  
 loratadine 10 mg tablet Commonly known as:  Clorinda Deer Your last dose was: Your next dose is:    
   
   
 Dose:  10 mg Take 10 mg by mouth daily as needed. Refills:  0  
     
   
   
   
  
 RESTASIS 0.05 % ophthalmic emulsion Generic drug:  cycloSPORINE Your last dose was: Your next dose is:    
   
   
 Dose:  1 Drop Administer 1 drop to both eyes two (2) times a day. Refills:  0 Where to Get Your Medications Information on where to get these meds will be given to you by the nurse or doctor. ! Ask your nurse or doctor about these medications HYDROcodone-acetaminophen 7.5-325 mg per tablet Discharge Instructions Discharge Instructions from Dr. Elida Matt · I will call you with the pathology results, typically within 1 week from today. · You may shower, but no hot tubs, swimming pools, or baths until your incision is healed. · No heavy lifting with the affected extremity (nothing greater than 5 pounds), and limit its use for the next 4-5 days. · You may use an ice pack for comfort for the next couple of days · Follow medication instructions carefully. · Watch for signs of infection as listed below. · Redness · Swelling · Drainage from the incision or from your nipple that appears infected · Fever over 101 degrees for consecutive readings, or over 99.5 if you are currently undergoing chemotherapy. · Call our office (number is below) for a follow-up appointment. · If you have any problems, our phone number is [phone]. DISCHARGE SUMMARY from Nurse The following personal items are in your possession at time of discharge: 
 
Dental Appliances: None Clothing:  (clothes in locker) worn home PATIENT INSTRUCTIONS: 
 
 
F-face looks uneven A-arms unable to move or move unevenly S-speech slurred or non-existent T-time-call 911 as soon as signs and symptoms begin-DO NOT go Back to bed or wait to see if you get better-TIME IS BRAIN. Warning Signs of HEART ATTACK Call 911 if you have these symptoms: 
? Chest discomfort. Most heart attacks involve discomfort in the center of the chest that lasts more than a few minutes, or that goes away and comes back. It can feel like uncomfortable pressure, squeezing, fullness, or pain. ? Discomfort in other areas of the upper body. Symptoms can include pain or discomfort in one or both arms, the back, neck, jaw, or stomach. ? Shortness of breath with or without chest discomfort. ? Other signs may include breaking out in a cold sweat, nausea, or lightheadedness. Don't wait more than five minutes to call [address]! Fast action can save your life.  Calling 911 is almost always the fastest way to get lifesaving treatment. Emergency Medical Services staff can begin treatment when they arrive  up to an hour sooner than if someone gets to the hospital by car. The discharge information has been reviewed with the patient and spouse. The patient and spouse verbalized understanding. Discharge medications reviewed with the patient and spouse and appropriate educational materials and side effects teaching were provided. Discharge Orders None Introducing [facility] SERVICES! [facility] introduces [employer] patient portal. Now you can access parts of your medical record, email your doctor's office, and request medication refills online. 1. In your internet browser, go to [email or web address] 2. Click on the First Time User? Click Here link in the Sign In box. You will see the New Member Sign Up page. 3. Enter your [employer] Access Code exactly as it appears below. You will not need to use this code after youve completed the sign-up process. If you do not sign up before the expiration date, you must request a new code. · [employer] Access Code: [email or web address] Expires: 9/6/2017  1:50 PM 
 
4. Enter the last four digits of your Social Security Number (xxxx) and Date of Birth (mm/dd/yyyy) as indicated and click Submit. You will be taken to the next sign-up page. 5. Create a [employer] ID. This will be your [employer] login ID and cannot be changed, so think of one that is secure and easy to remember. 6. Create a [employer] password. You can change your password at any time. 7. Enter your Password Reset Question and Answer. This can be used at a later time if you forget your password. 8. Enter your e-mail address. You will receive e-mail notification when new information is available in [address] 9. Click Sign Up. You can now view and download portions of your medical record.  
10. Click the Download Summary menu link to download a portable copy of your medical information. If you have questions, please visit the Frequently Asked Questions section of the [employer] website. Remember, MyChart is NOT to be used for urgent needs. For medical emergencies, dial 911. Now available from your iPhone and Android! General Information Please provide this summary of care documentation to your next provider. Patient Signature:  ____________________________________________________________ Date:  ____________________________________________________________  
  
[facility] Provider Signature:  ____________________________________________________________ Date:  ____________________________________________________________

## 2017-06-15 NOTE — ANESTHESIA PREPROCEDURE EVALUATION
Anesthetic History     PONV          Review of Systems / Medical History  Patient summary reviewed, nursing notes reviewed and pertinent labs reviewed    Pulmonary                   Neuro/Psych   Within defined limits           Cardiovascular                  Exercise tolerance: >4 METS     GI/Hepatic/Renal  Within defined limits              Endo/Other  Within defined limits           Other Findings              Physical Exam    Airway  Mallampati: I  TM Distance: > 6 cm  Neck ROM: normal range of motion   Mouth opening: Normal     Cardiovascular    Rhythm: regular  Rate: normal         Dental  No notable dental hx       Pulmonary  Breath sounds clear to auscultation               Abdominal  Abdominal exam normal       Other Findings            Anesthetic Plan    ASA: 2  Anesthesia type: general          Induction: Intravenous  Anesthetic plan and risks discussed with: Patient

## 2017-06-15 NOTE — OP NOTES
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    PROCEDURE PERFORMED: Left breast biopsy with needle   localization. SURGEON: Fito Lilly. Ling Rizzo MD.    ANESTHESIA: General.    SPECIMENS REMOVED: Left breast lump. ESTIMATED BLOOD LOSS: Minimal.    INDICATIONS: The patient is a 61-year-old female with a core biopsy   with atypical ductal hyperplasia, presents for excisional biopsy with   needle localization. DESCRIPTION OF PROCEDURE: After needle localization in   Radiology, the patient was brought to the operating room. After the   satisfactory induction of general LMA anesthesia, the patient was   prepped and draped in sterile fashion. A circumareolar incision was   made, deepened through subcutaneous tissue with Bovie cautery. The   wire was brought into the wound in the upper inner quadrant of the   breast and dissected down to the tip. Tissue around the tip of the wire   was removed. The specimen was oriented and specimen radiograph obtained   revealed the presence of the clip within the specimen. All dissection   planes were hemostatic. The wound was anesthetized with 0.5%   Marcaine, closed with interrupted 3-0 Vicryl and running subcuticular 4-  0 Monocryl on skin. The patient tolerated the procedure well without   complications. She was taken to the recovery room in stable condition.         MD Petra Francois / Westley Lipscomb   D:  06/15/2017   15:58   T:  06/15/2017   16:19   Job #:  [id number]

## 2017-06-15 NOTE — BRIEF OP NOTE
BRIEF OPERATIVE NOTE    Date of Procedure: 6/15/2017   Preoperative Diagnosis: ATYPICAL DUCTAL HYPERPLASIA  LEFT BREAST  Postoperative Diagnosis: LEFT BREAST ATYPICAL DUCTAL HYPERPLASIA    Procedure(s):  LEFT BREAST EXCISIONAL BIOPSY WITH NEEDLE LOCALIZATION   Surgeon(s) and Role:     * Nkio Martinez MD - Primary         Assistant Staff:       Surgical Staff:  Circ-1: Felicitas Guzmán RN  Registered Nurse Assistant: Joesph Santos RN  Scrub Tech-1: Rebecca Prajapati  Scrub RN-Relief: Randy Kam RN  Event Time In   Incision Start 1524   Incision Close      Anesthesia: General   Estimated Blood Loss: minimal  Specimens:   ID Type Source Tests Collected by Time Destination   1 : left breast excisional biopsy Fresh Breast  Niko Martinez MD [id number] 1534 Pathology      Findings: spec radiograph pos clip   Complications: none  Implants: * No implants in log *

## 2017-06-16 NOTE — ANESTHESIA POSTPROCEDURE EVALUATION
Post-Anesthesia Evaluation and Assessment    Patient: Rhys Abdul MRN: [id number]  SSN: [id number]    YOB: 1955  Age: 64 y.o. Sex: female       Cardiovascular Function/Vital Signs  Visit Vitals    /73 (BP 1 Location: Right arm, BP Patient Position: At rest;Sitting)    Pulse 78    Temp 36.3 °C (97.4 °F)    Resp 16    Ht 5' 6\" (1.676 m)    Wt 60.3 kg (133 lb)    SpO2 96%    BMI 21.47 kg/m2       Patient is status post general anesthesia for Procedure(s):  LEFT BREAST EXCISIONAL BIOPSY WITH NEEDLE LOCALIZATION . Nausea/Vomiting: None    Postoperative hydration reviewed and adequate. Pain:  Pain Scale 1: Numeric (0 - 10) (06/15/17 1832)  Pain Intensity 1: 0 (06/15/17 1832)   Managed    Neurological Status:   Neuro (WDL): Within Defined Limits (06/15/17 1712)   At baseline    Mental Status and Level of Consciousness: Arousable    Pulmonary Status:   O2 Device: Room air (06/15/17 1832)   Adequate oxygenation and airway patent    Complications related to anesthesia: None    Post-anesthesia assessment completed.  No concerns    Signed By: Rosalina Dyer MD     June 16, 2017

## 2017-06-26 ENCOUNTER — TELEPHONE (OUTPATIENT)
Dept: SURGERY | Age: 62
End: 2017-06-26

## 2017-06-26 NOTE — TELEPHONE ENCOUNTER
Informed patient that path was fine, no evidence of cancer. She has an appointment on 7/24/17. She was appreciative of the phone call.

## 2017-07-24 ENCOUNTER — OFFICE VISIT (OUTPATIENT)
Dept: SURGERY | Age: 62
End: 2017-07-24

## 2017-07-24 VITALS
BODY MASS INDEX: 21.38 KG/M2 | HEIGHT: 66 IN | HEART RATE: 67 BPM | DIASTOLIC BLOOD PRESSURE: 62 MMHG | SYSTOLIC BLOOD PRESSURE: 121 MMHG | WEIGHT: 133 LBS

## 2017-07-24 DIAGNOSIS — N60.92 ATYPICAL DUCTAL HYPERPLASIA OF LEFT BREAST: ICD-10-CM

## 2017-07-24 DIAGNOSIS — Z91.89 AT HIGH RISK FOR BREAST CANCER: Primary | ICD-10-CM

## 2017-07-24 NOTE — MR AVS SNAPSHOT
Visit Information Date & Time Provider Department Dept. Phone Encounter #  
 7/24/2017 45:28 AM Yanira Freeman MD [place] [phone] [id number] Follow-up Instructions Return in about 1 year (around 7/24/2018) for with Terri Lopez NP, with mammogram.  
 Follow-up and Disposition History Upcoming Health Maintenance Date Due Hepatitis C Screening 1955 DTaP/Tdap/Td series (1 - Tdap) 9/15/1976 PAP AKA CERVICAL CYTOLOGY 9/15/1976 FOBT Q 1 YEAR AGE 50-75 9/15/2005 ZOSTER VACCINE AGE 60> 7/15/2015 INFLUENZA AGE 9 TO ADULT 8/1/2017 BREAST CANCER SCRN MAMMOGRAM 4/18/2019 Allergies as of 7/24/2017  Review Complete On: 2/04/1780 By: Yanira Freeman MD  
  
 Severity Noted Reaction Type Reactions Percocet [Oxycodone-acetaminophen]  06/08/2017    Other (comments) \"I FELT LIKE I WAS SPIRALING DOWN AND HAD HORRIBLE NIGHTMARES\" Current Immunizations  Never Reviewed No immunizations on file. Not reviewed this visit You Were Diagnosed With   
  
 Codes Comments At high risk for breast cancer    -  Primary ICD-10-CM: Z91.89 ICD-9-CM: V49.89 Atypical ductal hyperplasia of left breast     ICD-10-CM: N60.92 
ICD-9-CM: 610.8 Vitals BP Pulse Height(growth percentile) Weight(growth percentile) BMI OB Status 121/62 67 5' 6\" (1.676 m) 133 lb (60.3 kg) 21.47 kg/m2 Hysterectomy Smoking Status Never Smoker BMI and BSA Data Body Mass Index Body Surface Area  
 21.47 kg/m 2 1.68 m 2 Preferred Pharmacy Pharmacy Name Phone [address], [address] [phone] Your Updated Medication List  
  
   
This list is accurate as of: 7/24/17  4:41 PM.  Always use your most recent med list. ADVIL 100 mg tablet Generic drug:  ibuprofen Take 600 mg by mouth every six (6) hours as needed for Pain. GAS-X 80 mg chewable tablet Generic drug:  simethicone Take 80 mg by mouth as needed for Flatulence. loratadine 10 mg tablet Commonly known as:  Noble Hickey Take 10 mg by mouth daily as needed. RESTASIS 0.05 % ophthalmic emulsion Generic drug:  cycloSPORINE Administer 1 drop to both eyes two (2) times a day. Follow-up Instructions Return in about 1 year (around 7/24/2018) for with Sofia Salvador NP, with mammogram.  
  
  
Introducing [facility]! Dear Betsey Obregon: Thank you for requesting a [employer] account. Our records indicate that you already have an active [employer] account. You can access your account anytime at [email or web address] Did you know that you can access your hospital and ER discharge instructions at any time in [employer]? You can also review all of your test results from your hospital stay or ER visit. Additional Information If you have questions, please visit the Frequently Asked Questions section of the [employer] website at [email or web address] Remember, [employer] is NOT to be used for urgent needs. For medical emergencies, dial 911. Now available from your iPhone and Android! Please provide this summary of care documentation to your next provider. Your primary care clinician is listed as NONE. If you have any questions after today's visit, please call [phone].

## 2017-07-24 NOTE — PROGRESS NOTES
HISTORY OF PRESENT ILLNESS  Loreto Black is a 64 y.o. female. HPI  ESTABLISHED patient here for surgical follow-up. Is S/P LEFT breast excisional biopsy almost six weeks ago. Did great after surgery, incision has healed well. Only used ice prn and ibuprofen post-op.       05/04/17: LT breast (11:00) core bx of ADH.     06/15/17: LT breast excisional bx of extensive sclerosing adenosis with focal atypical duct hyperplasia. Clear margins. Past Medical History:   Diagnosis Date    Arrhythmia     PVC'S IN PAST R/T STRESS    Fibrocystic breast     Headache     Nausea & vomiting     motion sickness issues       Past Surgical History:   Procedure Laterality Date    BREAST SURGERY PROCEDURE UNLISTED  1975    fibroadadenoma RIGHT     HX BREAST BIOPSY Bilateral over years    several stereotactic bx all neg    HX BREAST BIOPSY Right yrs ago    neg; surgical bx    HX BREAST BIOPSY Left 6/15/2017    LEFT BREAST EXCISIONAL BIOPSY WITH NEEDLE LOCALIZATION  performed by Gissel Resendiz MD at [address] HX HYSTERECTOMY      HX OTHER SURGICAL      LEFT AXILLARY NODE REMOVED    HX TUBAL LIGATION         Social History     Social History    Marital status:      Spouse name: N/A    Number of children: N/A    Years of education: N/A     Occupational History    Not on file. Social History Main Topics    Smoking status: Never Smoker    Smokeless tobacco: Never Used    Alcohol use Yes      Comment: RARELY    Drug use: No    Sexual activity: Not on file     Other Topics Concern    Not on file     Social History Narrative       Current Outpatient Prescriptions on File Prior to Visit   Medication Sig Dispense Refill    simethicone (GAS-X) 80 mg chewable tablet Take 80 mg by mouth as needed for Flatulence.  ibuprofen (ADVIL) 100 mg tablet Take 600 mg by mouth every six (6) hours as needed for Pain.  loratadine (CLARITIN) 10 mg tablet Take 10 mg by mouth daily as needed.       cycloSPORINE (RESTASIS) 0.05 % ophthalmic emulsion Administer 1 drop to both eyes two (2) times a day. No current facility-administered medications on file prior to visit. Allergies   Allergen Reactions    Percocet [Oxycodone-Acetaminophen] Other (comments)     \"I FELT LIKE I WAS SPIRALING DOWN AND HAD HORRIBLE NIGHTMARES\"       OB History      Para Term  AB Living    3         SAB TAB Ectopic Molar Multiple Live Births                 Obstetric Comments    Menarche:14  . LMP: 36.  # of Children:  3. Age at Delivery of First Child:  22.   Hysterectomy/oophorectomy:  YES/NO. Breast Bx:  yes. Hx of Breast Feeding:  yes. BCP:  yes. Hormone therapy:  no.          ROS  Constitutional: Negative    HENT: Negative. Eyes: Negative. Respiratory: Negative. Cardiovascular: Negative. Gastrointestinal: Negative. Genitourinary: Negative. Musculoskeletal: Negative. Skin: Negative. Neurological: Negative. Endo/Heme/Allergies: Negative. Psychiatric/Behavioral: Negative. Physical Exam   Cardiovascular: Normal rate and normal heart sounds. Pulmonary/Chest: Breath sounds normal. Right breast exhibits no inverted nipple, no mass, no nipple discharge, no skin change and no tenderness. Left breast exhibits no inverted nipple, no mass, no nipple discharge, no skin change and no tenderness. Breasts are symmetrical.       Lymphadenopathy:        Right cervical: No superficial cervical, no deep cervical and no posterior cervical adenopathy present. Left cervical: No superficial cervical, no deep cervical and no posterior cervical adenopathy present. Right axillary: No pectoral and no lateral adenopathy present. Left axillary: No pectoral and no lateral adenopathy present. ASSESSMENT and PLAN    ICD-10-CM ICD-9-CM    1. At high risk for breast cancer Z91.89 V49.89    2.  Atypical ductal hyperplasia of left breast N60.92 610.8      Pt s/p LT breast excisional bx and doing well. Discussed in depth the pathology results. Using the [address], calculated pt's lifetime risk at >20% for breast cancer. This qualifies her for enrollment into our high risk clinic that includes annual breast imaging and follow-up appointments with our nurse practitioner. Repeat mammo and f/u with Good Adams NP in 1 year. This plan was reviewed with the patient and patient agrees. All questions were answered.     Written by Social DJ, as dictated by Dr. Sonia Cesar MD.

## 2017-07-24 NOTE — PROGRESS NOTES
HISTORY OF PRESENT ILLNESS  Lisa Amaro is a 64 y.o. female. HPI   ESTABLISHED patient here for surgical follow-up. Is S/P LEFT breast excisional biopsy almost six weeks ago. Did great after surgery, incision has healed well. Only used ice prn and ibuprofen post-op. 05/04/17: LT breast (11:00) core bx of ADH.    06/15/17: LT breast excisional bx of extensive sclerosing adenosis with focal atypical duct hyperplasia. Clear margins.     ROS    Physical Exam    ASSESSMENT and PLAN  {ASSESSMENT/PLAN:[id number]}

## 2017-07-24 NOTE — COMMUNICATION BODY
HISTORY OF PRESENT ILLNESS  Carolyn Karimi is a 64 y.o. female. HPI  ESTABLISHED patient here for surgical follow-up. Is S/P LEFT breast excisional biopsy almost six weeks ago. Did great after surgery, incision has healed well. Only used ice prn and ibuprofen post-op.       05/04/17: LT breast (11:00) core bx of ADH.     06/15/17: LT breast excisional bx of extensive sclerosing adenosis with focal atypical duct hyperplasia. Clear margins. Past Medical History:   Diagnosis Date    Arrhythmia     PVC'S IN PAST R/T STRESS    Fibrocystic breast     Headache     Nausea & vomiting     motion sickness issues       Past Surgical History:   Procedure Laterality Date    BREAST SURGERY PROCEDURE UNLISTED  1975    fibroadadenoma RIGHT     HX BREAST BIOPSY Bilateral over years    several stereotactic bx all neg    HX BREAST BIOPSY Right yrs ago    neg; surgical bx    HX BREAST BIOPSY Left 6/15/2017    LEFT BREAST EXCISIONAL BIOPSY WITH NEEDLE LOCALIZATION  performed by Sterling Aleman MD at [address] HX HYSTERECTOMY      HX OTHER SURGICAL      LEFT AXILLARY NODE REMOVED    HX TUBAL LIGATION         Social History     Social History    Marital status:      Spouse name: N/A    Number of children: N/A    Years of education: N/A     Occupational History    Not on file. Social History Main Topics    Smoking status: Never Smoker    Smokeless tobacco: Never Used    Alcohol use Yes      Comment: RARELY    Drug use: No    Sexual activity: Not on file     Other Topics Concern    Not on file     Social History Narrative       Current Outpatient Prescriptions on File Prior to Visit   Medication Sig Dispense Refill    simethicone (GAS-X) 80 mg chewable tablet Take 80 mg by mouth as needed for Flatulence.  ibuprofen (ADVIL) 100 mg tablet Take 600 mg by mouth every six (6) hours as needed for Pain.  loratadine (CLARITIN) 10 mg tablet Take 10 mg by mouth daily as needed.       cycloSPORINE (RESTASIS) 0.05 % ophthalmic emulsion Administer 1 drop to both eyes two (2) times a day. No current facility-administered medications on file prior to visit. Allergies   Allergen Reactions    Percocet [Oxycodone-Acetaminophen] Other (comments)     \"I FELT LIKE I WAS SPIRALING DOWN AND HAD HORRIBLE NIGHTMARES\"       OB History      Para Term  AB Living    3         SAB TAB Ectopic Molar Multiple Live Births                 Obstetric Comments    Menarche:14  . LMP: 36.  # of Children:  3. Age at Delivery of First Child:  22.   Hysterectomy/oophorectomy:  YES/NO. Breast Bx:  yes. Hx of Breast Feeding:  yes. BCP:  yes. Hormone therapy:  no.          ROS  Constitutional: Negative    HENT: Negative. Eyes: Negative. Respiratory: Negative. Cardiovascular: Negative. Gastrointestinal: Negative. Genitourinary: Negative. Musculoskeletal: Negative. Skin: Negative. Neurological: Negative. Endo/Heme/Allergies: Negative. Psychiatric/Behavioral: Negative. Physical Exam   Cardiovascular: Normal rate and normal heart sounds. Pulmonary/Chest: Breath sounds normal. Right breast exhibits no inverted nipple, no mass, no nipple discharge, no skin change and no tenderness. Left breast exhibits no inverted nipple, no mass, no nipple discharge, no skin change and no tenderness. Breasts are symmetrical.       Lymphadenopathy:        Right cervical: No superficial cervical, no deep cervical and no posterior cervical adenopathy present. Left cervical: No superficial cervical, no deep cervical and no posterior cervical adenopathy present. Right axillary: No pectoral and no lateral adenopathy present. Left axillary: No pectoral and no lateral adenopathy present. ASSESSMENT and PLAN    ICD-10-CM ICD-9-CM    1. At high risk for breast cancer Z91.89 V49.89    2.  Atypical ductal hyperplasia of left breast N60.92 610.8      Pt s/p LT breast excisional bx and doing well. Discussed in depth the pathology results. Using the [address], calculated pt's lifetime risk at >20% for breast cancer. This qualifies her for enrollment into our high risk clinic that includes annual breast imaging and follow-up appointments with our nurse practitioner. Repeat mammo and f/u with Perez Greene NP in 1 year. This plan was reviewed with the patient and patient agrees. All questions were answered.     Written by Raudel Sanches, as dictated by Dr. Earl Dior MD.

## 2017-09-20 ENCOUNTER — OFFICE VISIT (OUTPATIENT)
Dept: FAMILY MEDICINE CLINIC | Age: 62
End: 2017-09-20

## 2017-09-20 VITALS
BODY MASS INDEX: 20.89 KG/M2 | HEART RATE: 57 BPM | SYSTOLIC BLOOD PRESSURE: 117 MMHG | WEIGHT: 130 LBS | HEIGHT: 66 IN | DIASTOLIC BLOOD PRESSURE: 66 MMHG | TEMPERATURE: 98.1 F | RESPIRATION RATE: 18 BRPM | OXYGEN SATURATION: 100 %

## 2017-09-20 DIAGNOSIS — B35.1 ONYCHOMYCOSIS: Primary | ICD-10-CM

## 2017-09-20 DIAGNOSIS — Z76.89 ENCOUNTER TO ESTABLISH CARE: ICD-10-CM

## 2017-09-20 NOTE — PROGRESS NOTES
Identified pt with two pt identifiers(name and ). Chief Complaint   Patient presents with   Paolo Poli 79 [facility] retired    [address] liver function for [facility] for new medication        Health Maintenance Due   Topic    Hepatitis C Screening     DTaP/Tdap/Td series (1 - Tdap)    PAP AKA CERVICAL CYTOLOGY     FOBT Q 1 YEAR AGE 50-75     ZOSTER VACCINE AGE 60>     INFLUENZA AGE 9 TO ADULT        Wt Readings from Last 3 Encounters:   17 130 lb (59 kg)   17 133 lb (60.3 kg)   06/15/17 133 lb (60.3 kg)     Temp Readings from Last 3 Encounters:   17 98.1 °F (36.7 °C) (Oral)   06/15/17 97.4 °F (36.3 °C)   17 97.9 °F (36.6 °C)     BP Readings from Last 3 Encounters:   17 117/66   17 121/62   06/15/17 124/73     Pulse Readings from Last 3 Encounters:   17 (!) 57   17 67   06/15/17 78         Learning Assessment:  :     Learning Assessment 3/10/2017   PRIMARY LEARNER Patient   PRIMARY LANGUAGE ENGLISH   LEARNER PREFERENCE PRIMARY DEMONSTRATION   ANSWERED BY patient   RELATIONSHIP SELF       Depression Screening:  :     PHQ over the last two weeks 2017   Little interest or pleasure in doing things Not at all   Feeling down, depressed or hopeless Not at all   Total Score PHQ 2 0       Fall Risk Assessment:  :     Fall Risk Assessment, last 12 mths 2017   Able to walk? Yes   Fall in past 12 months? No       Abuse Screening:  :     Abuse Screening Questionnaire 2017   Do you ever feel afraid of your partner? N   Are you in a relationship with someone who physically or mentally threatens you? N   Is it safe for you to go home?  Y       Coordination of Care Questionnaire:  :     1) Have you been to an emergency room, urgent care clinic since your last visit? no   Hospitalized since your last visit? no             2) Have you seen or consulted any other health care providers outside of [facility] since your last visit? yes [facility]. [facility]  (Include any pap smears or colon screenings in this section.)    3) Do you have an Advance Directive on file? no  Are you interested in receiving information about Advance Directives? no    Reviewed record in preparation for visit and have obtained necessary documentation. Medication reconciliation up to date and corrected with patient at this time.

## 2017-09-20 NOTE — PATIENT INSTRUCTIONS

## 2017-09-20 NOTE — MR AVS SNAPSHOT
Visit Information Date & Time Provider Department Dept. Phone Encounter #  
 9/20/2017  8:45 AM Zia Bales [facility] [phone] [id number] Follow-up Instructions Return as needed. Upcoming Health Maintenance Date Due Hepatitis C Screening 1955 DTaP/Tdap/Td series (1 - Tdap) 9/15/1976 PAP AKA CERVICAL CYTOLOGY 9/15/1976 FOBT Q 1 YEAR AGE 50-75 9/15/2005 ZOSTER VACCINE AGE 60> 7/15/2015 INFLUENZA AGE 9 TO ADULT 8/1/2017 BREAST CANCER SCRN MAMMOGRAM 4/18/2019 Allergies as of 9/20/2017  Review Complete On: 9/20/2017 By: Perez Dubose MD  
  
 Severity Noted Reaction Type Reactions Percocet [Oxycodone-acetaminophen]  06/08/2017    Other (comments) \"I FELT LIKE I WAS SPIRALING DOWN AND HAD HORRIBLE NIGHTMARES\" Current Immunizations  Never Reviewed No immunizations on file. Not reviewed this visit You Were Diagnosed With   
  
 Codes Comments Onychomycosis    -  Primary ICD-10-CM: B35.1 ICD-9-CM: 110.1 Encounter to establish care     ICD-10-CM: Z76.89 
ICD-9-CM: V65.8 Vitals BP Pulse Temp Resp Height(growth percentile) Weight(growth percentile)  
 117/66 (BP 1 Location: Right arm, BP Patient Position: Sitting) (!) 57 98.1 °F (36.7 °C) (Oral) 18 5' 6\" (1.676 m) 130 lb (59 kg) SpO2 BMI OB Status Smoking Status 100% 20.98 kg/m2 Hysterectomy Never Smoker BMI and BSA Data Body Mass Index Body Surface Area  
 20.98 kg/m 2 1.66 m 2 Preferred Pharmacy Pharmacy Name Phone [address], [address] [address] [address] [phone] Your Updated Medication List  
  
   
This list is accurate as of: 9/20/17  9:40 AM.  Always use your most recent med list. ADVIL 100 mg tablet Generic drug:  ibuprofen Take 600 mg by mouth every six (6) hours as needed for Pain. GAS-X 80 mg chewable tablet Generic drug:  simethicone Take 80 mg by mouth as needed for Flatulence. loratadine 10 mg tablet Commonly known as:  Shona Chandler Take 10 mg by mouth daily as needed. RESTASIS 0.05 % ophthalmic emulsion Generic drug:  cycloSPORINE Administer 1 drop to both eyes two (2) times a day. We Performed the Following RENAL FUNCTION PANEL [52954 CPT(R)] Follow-up Instructions Return as needed. Patient Instructions A Healthy Lifestyle: Care Instructions Your Care Instructions A healthy lifestyle can help you feel good, stay at a healthy weight, and have plenty of energy for both work and play. A healthy lifestyle is something you can share with your whole family. A healthy lifestyle also can lower your risk for serious health problems, such as high blood pressure, heart disease, and diabetes. You can follow a few steps listed below to improve your health and the health of your family. Follow-up care is a key part of your treatment and safety. Be sure to make and go to all appointments, and call your doctor if you are having problems. Its also a good idea to know your test results and keep a list of the medicines you take. How can you care for yourself at home? · Do not eat too much sugar, fat, or fast foods. You can still have dessert and treats now and then. The goal is moderation. · Start small to improve your eating habits. Pay attention to portion sizes, drink less juice and soda pop, and eat more fruits and vegetables. ¨ Eat a healthy amount of food. A 3-ounce serving of meat, for example, is about the size of a deck of cards. Fill the rest of your plate with vegetables and whole grains. ¨ Limit the amount of soda and sports drinks you have every day. Drink more water when you are thirsty. ¨ Eat at least 5 servings of fruits and vegetables every day.  It may seem like a lot, but it is not hard to reach this goal. A serving or helping is 1 piece of fruit, 1 cup of vegetables, or 2 cups of leafy, raw vegetables. Have an apple or some carrot sticks as an afternoon snack instead of a candy bar. Try to have fruits and/or vegetables at every meal. 
· Make exercise part of your daily routine. You may want to start with simple activities, such as walking, bicycling, or slow swimming. Try to be active 30 to 60 minutes every day. You do not need to do all 30 to 60 minutes all at once. For example, you can exercise 3 times a day for 10 or 20 minutes. Moderate exercise is safe for most people, but it is always a good idea to talk to your doctor before starting an exercise program. 
· Keep moving. Inell Perla the lawn, work in the garden, or Vidimax. Take the stairs instead of the elevator at work. · If you smoke, quit. People who smoke have an increased risk for heart attack, stroke, cancer, and other lung illnesses. Quitting is hard, but there are ways to boost your chance of quitting tobacco for good. ¨ Use nicotine gum, patches, or lozenges. ¨ Ask your doctor about stop-smoking programs and medicines. ¨ Keep trying. In addition to reducing your risk of diseases in the future, you will notice some benefits soon after you stop using tobacco. If you have shortness of breath or asthma symptoms, they will likely get better within a few weeks after you quit. · Limit how much alcohol you drink. Moderate amounts of alcohol (up to 2 drinks a day for men, 1 drink a day for women) are okay. But drinking too much can lead to liver problems, high blood pressure, and other health problems. Family health If you have a family, there are many things you can do together to improve your health. · Eat meals together as a family as often as possible. · Eat healthy foods. This includes fruits, vegetables, lean meats and dairy, and whole grains. · Include your family in your fitness plan.  Most people think of activities such as jogging or tennis as the way to fitness, but there are many ways you and your family can be more active. Anything that makes you breathe hard and gets your heart pumping is exercise. Here are some tips: 
¨ Walk to do errands or to take your child to school or the bus. ¨ Go for a family bike ride after dinner instead of watching TV. Where can you learn more? Go to http://bambi-gabby.info/. Enter T809 in the search box to learn more about \"A Healthy Lifestyle: Care Instructions. \" Current as of: July 26, 2016 Content Version: 11.3 © 9440-7519 [employer]. Care instructions adapted under license by [employer] (which disclaims liability or warranty for this information). If you have questions about a medical condition or this instruction, always ask your healthcare professional. [address] any warranty or liability for your use of this information. Introducing [facility]! Dear Flores Garcia: Thank you for requesting a [employer] account. Our records indicate that you already have an active [employer] account. You can access your account anytime at [email or web address] Did you know that you can access your hospital and ER discharge instructions at any time in [employer]? You can also review all of your test results from your hospital stay or ER visit. Additional Information If you have questions, please visit the Frequently Asked Questions section of the [employer] website at [email or web address] Remember, [employer] is NOT to be used for urgent needs. For medical emergencies, dial 911. Now available from your iPhone and Android! Please provide this summary of care documentation to your next provider. Your primary care clinician is listed as Suzanne Veloz. If you have any questions after today's visit, please call [phone].

## 2017-09-20 NOTE — PROGRESS NOTES
Subjective:      Victor M Villatoro is a 58 y.o. female here today to establish care. She will be started an oral medication for toenail fungus and needs to have lab testing performed prior to starting. She is otherwise doing well and has no other concerns or complaints at this time. Health Maintenance:  · Cervical cancer screening: s/p hysterectomy at age 39 for menorrhagia/leiomyoma   · Mammogram: has yearly with Dr. Maria Fernanda Mayes   · Colonoscopy: had age 48, normal per her report   · Hepatitis C screening (born between [birth year] and 1965): has not been screened to her knowledge, but reports that she donated blood in August 2017   · Tetanus: believes was 7 years ago (Tdap, after her grandson was born)  · Influenza vaccine: does yearly through her employer     Current Outpatient Prescriptions   Medication Sig Dispense Refill    simethicone (GAS-X) 80 mg chewable tablet Take 80 mg by mouth as needed for Flatulence.  ibuprofen (ADVIL) 100 mg tablet Take 600 mg by mouth every six (6) hours as needed for Pain.  loratadine (CLARITIN) 10 mg tablet Take 10 mg by mouth daily as needed.  cycloSPORINE (RESTASIS) 0.05 % ophthalmic emulsion Administer 1 drop to both eyes two (2) times a day.          Allergies   Allergen Reactions    Percocet [Oxycodone-Acetaminophen] Other (comments)     \"I FELT LIKE I WAS SPIRALING DOWN AND HAD HORRIBLE NIGHTMARES\"       Past Medical History:   Diagnosis Date    Arrhythmia     PVC'S IN PAST R/T STRESS    Fibrocystic breast     Headache     Nausea & vomiting     motion sickness issues       Social history - reviewed   Social History   Substance Use Topics    Smoking status: Never Smoker    Smokeless tobacco: Never Used    Alcohol use Yes      Comment: RARELY        Family history - reviewed   Family History   Problem Relation Age of Onset    Cancer Sister      BREAST, LEUKEMIA    Cancer Sister      BREAST    Other Sister      OSTEOPENIA    Arrhythmia Mother      AFIB    Stroke Mother     Hypertension Mother     Heart Disease Father      CHF    Stroke Father     Other Father      ANEURYSM    Hypertension Father     Hypertension Brother     Other Son      SUDDEN LOSS OF HEARING IN 1 EAR    Anesth Problems Neg Hx          Review of Systems  Constitutional: negative for fevers and chills  Eyes: negative for visual disturbance and irritation  Ears, nose, mouth, throat, and face: negative for nasal congestion and sore throat  Respiratory: negative for cough, sputum or dyspnea on exertion  Cardiovascular: negative for chest pain, chest pressure/discomfort, palpitations, irregular heart beats, lower extremity edema  Gastrointestinal: negative for nausea, vomiting, change in bowel habits and abdominal pain  Genitourinary:negative for frequency, dysuria and hematuria  Musculoskeletal:negative for myalgias and arthralgias  Neurological: negative for headaches, dizziness and paresthesia     Objective:     Visit Vitals    /66 (BP 1 Location: Right arm, BP Patient Position: Sitting)    Pulse (!) 57    Temp 98.1 °F (36.7 °C) (Oral)    Resp 18    Ht 5' 6\" (1.676 m)    Wt 130 lb (59 kg)    SpO2 100%    BMI 20.98 kg/m2      General appearance - alert, well appearing, and in no distress  Eyes - pupils equal and reactive, extraocular eye movements intact, sclera anicteric  Oropharyngx - mucous membranes moist, pharynx normal without lesions  Neck - supple, no significant adenopathy, carotids upstroke normal bilaterally, no bruits  Chest - clear to auscultation, no wheezes, rales or rhonchi, symmetric air entry, no tachypnea, retractions or cyanosis  Heart - normal rate, regular rhythm, normal S1, S2, no murmurs, rubs, clicks or gallops  Abdomen - soft, nontender, nondistended, no masses or organomegaly  Neurological - alert, oriented, normal speech, no focal findings or movement disorder noted  Extremities - peripheral pulses normal, no pedal edema, no clubbing or cyanosis  Skin - warm and dry, normal color and turgor, (+) toenail onychomycosis     Assessment/Plan:   Roosevelt Yoon is a 58 y.o. female seen for:     1. Onychomycosis: will need to check hepatic function and will send results to her Dermatologist (Dr. Radha Enriquez). Prior to closing patient's chart, realized that a renal function panel had been ordered. Written authorization provided to [facility] to run hepatic function panel.   - RENAL FUNCTION PANEL    2. Encounter to establish care    I have discussed the diagnosis with the patient and the intended plan as seen in the above orders. The patient has received an after-visit summary and questions were answered concerning future plans. I have discussed medication side effects and warnings with the patient as well. Patient verbalizes understanding of plan of care and denies further questions or concerns at this time. Informed patient to return to the office if symptoms worsen or if new symptoms arise. Follow-up Disposition:  Return as needed.

## 2017-09-21 LAB
ALBUMIN SERPL-MCNC: 4.3 G/DL (ref 3.6–4.8)
BUN SERPL-MCNC: 15 MG/DL (ref 8–27)
BUN/CREAT SERPL: 17 (ref 12–28)
CALCIUM SERPL-MCNC: 9.6 MG/DL (ref 8.7–10.3)
CHLORIDE SERPL-SCNC: 104 MMOL/L (ref 96–106)
CO2 SERPL-SCNC: 29 MMOL/L (ref 18–29)
CREAT SERPL-MCNC: 0.87 MG/DL (ref 0.57–1)
GLUCOSE SERPL-MCNC: 89 MG/DL (ref 65–99)
PHOSPHATE SERPL-MCNC: 4 MG/DL (ref 2.5–4.5)
POTASSIUM SERPL-SCNC: 4.8 MMOL/L (ref 3.5–5.2)
SODIUM SERPL-SCNC: 144 MMOL/L (ref 134–144)

## 2017-09-27 LAB
ALBUMIN SERPL-MCNC: 4.4 G/DL (ref 3.6–4.8)
ALP SERPL-CCNC: 68 IU/L (ref 39–117)
ALT SERPL-CCNC: 10 IU/L (ref 0–32)
AST SERPL-CCNC: 17 IU/L (ref 0–40)
BILIRUB DIRECT SERPL-MCNC: 0.18 MG/DL (ref 0–0.4)
BILIRUB SERPL-MCNC: 0.8 MG/DL (ref 0–1.2)
PROT SERPL-MCNC: 6.5 G/DL (ref 6–8.5)
WRITTEN AUTHORIZATION, 977900: NORMAL

## 2017-12-20 ENCOUNTER — OFFICE VISIT (OUTPATIENT)
Dept: FAMILY MEDICINE CLINIC | Age: 62
End: 2017-12-20

## 2017-12-20 VITALS
SYSTOLIC BLOOD PRESSURE: 128 MMHG | WEIGHT: 131 LBS | RESPIRATION RATE: 18 BRPM | HEART RATE: 78 BPM | HEIGHT: 66 IN | DIASTOLIC BLOOD PRESSURE: 80 MMHG | BODY MASS INDEX: 21.05 KG/M2 | OXYGEN SATURATION: 99 % | TEMPERATURE: 97.9 F

## 2017-12-20 DIAGNOSIS — Z13.220 SCREENING FOR LIPID DISORDERS: ICD-10-CM

## 2017-12-20 DIAGNOSIS — Z23 NEED FOR SHINGLES VACCINE: ICD-10-CM

## 2017-12-20 DIAGNOSIS — Z00.00 ROUTINE GENERAL MEDICAL EXAMINATION AT A HEALTH CARE FACILITY: Primary | ICD-10-CM

## 2017-12-20 DIAGNOSIS — Z00.00 LABORATORY EXAMINATION ORDERED AS PART OF A ROUTINE GENERAL MEDICAL EXAMINATION: ICD-10-CM

## 2017-12-20 RX ORDER — PREDNISOLONE ACETATE 10 MG/ML
SUSPENSION/ DROPS OPHTHALMIC
COMMUNITY
Start: 2017-11-29 | End: 2018-04-20

## 2017-12-20 NOTE — PATIENT INSTRUCTIONS

## 2017-12-20 NOTE — PROGRESS NOTES
Subjective:   Paige Dubon is a 58 y.o. female here today for her annual physical exam. She is fasting today for labs. Health Maintenance:  · Cervical cancer screening: s/p partial hysterectomy at age 39 for menorrhagia/leiomyoma, reports that her ovaries are still in place   · Mammogram: has yearly with Dr. Stephania Alberts   · Colonoscopy: had age 48, normal per her report to be repeated in 8 years, Dr. Radha Alvarez  · Hepatitis C screening (born between [birth year] and 1965): has not been screened to her knowledge, but reports that she donated blood in August 2017   · Tetanus: Tdap 7/1/2009  · Influenza vaccine: 11/1/17   · Shingles vaccine: discussed today and prescription provided     Specific concerns today:   - Has vaginal dryness and pain with intercourse. She has tried vaginal lubricants without much effectiveness. Discussed Gynecological evaluation and she will schedule appointment. Current Outpatient Prescriptions   Medication Sig Dispense Refill    prednisoLONE acetate (PRED FORTE) 1 % ophthalmic suspension       ibuprofen (ADVIL) 100 mg tablet Take 600 mg by mouth every six (6) hours as needed for Pain.  loratadine (CLARITIN) 10 mg tablet Take 10 mg by mouth daily as needed.  cycloSPORINE (RESTASIS) 0.05 % ophthalmic emulsion Administer 1 drop to both eyes two (2) times a day.  simethicone (GAS-X) 80 mg chewable tablet Take 80 mg by mouth as needed for Flatulence.          Allergies   Allergen Reactions    Percocet [Oxycodone-Acetaminophen] Other (comments)     \"I FELT LIKE I WAS SPIRALING DOWN AND HAD HORRIBLE NIGHTMARES\"       Past Medical History:   Diagnosis Date    Arrhythmia     PVC'S IN PAST R/T STRESS    Fibrocystic breast     Headache     Nausea & vomiting     motion sickness issues       Past Surgical History:   Procedure Laterality Date    BREAST SURGERY PROCEDURE UNLISTED  1975    fibroadadenoma RIGHT     HX BREAST BIOPSY Bilateral over years    several stereotactic bx all neg  HX BREAST BIOPSY Right yrs ago    neg; surgical bx    HX BREAST BIOPSY Left 6/15/2017    LEFT BREAST EXCISIONAL BIOPSY WITH NEEDLE LOCALIZATION  performed by Dale Galeana MD at [address] HX HYSTERECTOMY      HX OTHER SURGICAL      LEFT AXILLARY NODE REMOVED    HX TUBAL LIGATION         Social History   Substance Use Topics    Smoking status: Never Smoker    Smokeless tobacco: Never Used    Alcohol use Yes      Comment: RARELY        Lab Results  Component Value Date/Time   ALT (SGPT) 10 09/20/2017 09:40 AM   AST (SGOT) 17 09/20/2017 09:40 AM   Alk. phosphatase 68 09/20/2017 09:40 AM   Bilirubin, direct 0.18 09/20/2017 09:40 AM   Bilirubin, total 0.8 09/20/2017 09:40 AM   Albumin 4.3 09/20/2017 09:40 AM   Albumin 4.4 09/20/2017 09:40 AM   Protein, total 6.5 09/20/2017 09:40 AM   PLATELET 473 88/01/1701 02:46 PM       Lab Results  Component Value Date/Time   GFR est non-AA 72 09/20/2017 09:40 AM   GFR est AA 83 09/20/2017 09:40 AM   Creatinine 0.87 09/20/2017 09:40 AM   BUN 15 09/20/2017 09:40 AM   Sodium 144 09/20/2017 09:40 AM   Potassium 4.8 09/20/2017 09:40 AM   Chloride 104 09/20/2017 09:40 AM   CO2 29 09/20/2017 09:40 AM   Phosphorus 4.0 09/20/2017 09:40 AM        ROS: Feeling generally well. No TIA's or unusual headaches, no dysphagia. No prolonged cough. No dyspnea or chest pain on exertion. No abdominal pain, change in bowel habits, black or bloody stools. No urinary tract symptoms. No new or unusual musculoskeletal symptoms. Objective:     Visit Vitals    /80 (BP 1 Location: Right arm, BP Patient Position: Sitting)  Comment: manual    Pulse 78    Temp 97.9 °F (36.6 °C) (Oral)    Resp 18    Ht 5' 6\" (1.676 m)    Wt 131 lb (59.4 kg)    SpO2 99%    BMI 21.14 kg/m2     The patient appears well, alert, oriented x 3, in no distress. ENT normal.  Neck supple. No adenopathy or thyromegaly. ALBERTO. Lungs are clear, good air entry, no wheezes, rhonchi or rales.  S1 and S2 normal, no murmurs, regular rate and rhythm. Abdomen soft without tenderness, guarding, mass or organomegaly. Extremities show no edema, normal peripheral pulses. Neurological is normal, no focal findings. Breast and Pelvic exams are deferred. Assessment/Plan:   Gail El is a 58 y.o. female seen today for:     1. Routine general medical examination at a health care facility: healthy. Will schedule Gynecology appointment. 2. Screening for lipid disorders  - LIPID PANEL    3. Laboratory examination ordered as part of a routine general medical examination  - CBC WITH AUTOMATED DIFF  - LIPID PANEL    4. Need for shingles vaccine: prescription provided and she is encouraged to have administered at her local pharmacy. - varicella zoster vaccine live (VARICELLA-ZOSTER VACINE LIVE) 19,400 unit/0.65 mL susr injection; 1 Vial by SubCUTAneous route once for 1 dose. Indications: PREVENTION OF HERPES ZOSTER  Dispense: 0.65 mL; Refill: 0    I have discussed the diagnosis with the patient and the intended plan as seen in the above orders. The patient has received an after-visit summary and questions were answered concerning future plans. I have discussed medication side effects and warnings with the patient as well. Patient verbalizes understanding of plan of care and denies further questions or concerns at this time. Informed patient to return to the office if symptoms worsen or if new symptoms arise. Follow-up Disposition:  Return for annual physical exam or sooner as needed.

## 2017-12-20 NOTE — MR AVS SNAPSHOT
Visit Information Date & Time Provider Department Dept. Phone Encounter #  
 12/20/2017  8:00 AM Jensen HintonZia [phone] [id number] Follow-up Instructions Return for annual physical exam or sooner as needed. Upcoming Health Maintenance Date Due COLONOSCOPY 9/15/1973 ZOSTER VACCINE AGE 60> 7/15/2015 DTaP/Tdap/Td series (2 - Td) 11/11/2019 PAP AKA CERVICAL CYTOLOGY 12/20/2020 Allergies as of 12/20/2017  Review Complete On: 12/20/2017 By: Jensen Hinton MD  
  
 Severity Noted Reaction Type Reactions Percocet [Oxycodone-acetaminophen]  06/08/2017    Other (comments) \"I FELT LIKE I WAS SPIRALING DOWN AND HAD HORRIBLE NIGHTMARES\" Current Immunizations  Reviewed on 12/20/2017 Name Date Influenza Vaccine 11/1/2017 Influenza Vaccine (Quad) PF 10/1/2010 12:00 AM  
 Tdap 7/1/2009 12:00 AM  
  
 Reviewed by Jensen Hinton MD on 12/20/2017 at  8:15 AM  
You Were Diagnosed With   
  
 Codes Comments Routine general medical examination at a health care facility    -  Primary ICD-10-CM: Z00.00 ICD-9-CM: V70.0 Screening for lipid disorders     ICD-10-CM: Z13.220 ICD-9-CM: V77.91 Laboratory examination ordered as part of a routine general medical examination     ICD-10-CM: Z00.00 ICD-9-CM: V72.62 Need for shingles vaccine     ICD-10-CM: U73 ICD-9-CM: V04.89 Vitals BP Pulse Temp Resp Height(growth percentile) Weight(growth percentile) 128/80 (BP 1 Location: Right arm, BP Patient Position: Sitting) 78 97.9 °F (36.6 °C) (Oral) 18 5' 6\" (1.676 m) 131 lb (59.4 kg) SpO2 BMI OB Status Smoking Status 99% 21.14 kg/m2 Hysterectomy Never Smoker Vitals History BMI and BSA Data Body Mass Index Body Surface Area  
 21.14 kg/m 2 1.66 m 2 Preferred Pharmacy Pharmacy Name Phone [address], [address] [address] [facility] [phone] Your Updated Medication List  
  
   
This list is accurate as of: 17  8:32 AM.  Always use your most recent med list. ADVIL 100 mg tablet Generic drug:  ibuprofen Take 600 mg by mouth every six (6) hours as needed for Pain. GAS-X 80 mg chewable tablet Generic drug:  simethicone Take 80 mg by mouth as needed for Flatulence. loratadine 10 mg tablet Commonly known as:  Radha Hunting Take 10 mg by mouth daily as needed. prednisoLONE acetate 1 % ophthalmic suspension Commonly known as:  PRED FORTE  
  
 RESTASIS 0.05 % ophthalmic emulsion Generic drug:  cycloSPORINE Administer 1 drop to both eyes two (2) times a day. varicella zoster vaccine live 19,400 unit/0.65 mL Susr injection Commonly known as:  varicella-zoster vacine live 1 Vial by SubCUTAneous route once for 1 dose. Indications: PREVENTION OF HERPES ZOSTER Prescriptions Printed Refills  
 varicella zoster vaccine live (VARICELLA-ZOSTER VACINE LIVE) 19,400 unit/0.65 mL susr injection 0 Si Vial by SubCUTAneous route once for 1 dose. Indications: PREVENTION OF HERPES ZOSTER Class: Print Route: SubCUTAneous We Performed the Following CBC WITH AUTOMATED DIFF [81106 CPT(R)] LIPID PANEL [61857 CPT(R)] Follow-up Instructions Return for annual physical exam or sooner as needed. Patient Instructions A Healthy Lifestyle: Care Instructions Your Care Instructions A healthy lifestyle can help you feel good, stay at a healthy weight, and have plenty of energy for both work and play. A healthy lifestyle is something you can share with your whole family. A healthy lifestyle also can lower your risk for serious health problems, such as high blood pressure, heart disease, and diabetes. You can follow a few steps listed below to improve your health and the health of your family. Follow-up care is a key part of your treatment and safety. Be sure to make and go to all appointments, and call your doctor if you are having problems. It's also a good idea to know your test results and keep a list of the medicines you take. How can you care for yourself at home? · Do not eat too much sugar, fat, or fast foods. You can still have dessert and treats now and then. The goal is moderation. · Start small to improve your eating habits. Pay attention to portion sizes, drink less juice and soda pop, and eat more fruits and vegetables. ¨ Eat a healthy amount of food. A 3-ounce serving of meat, for example, is about the size of a deck of cards. Fill the rest of your plate with vegetables and whole grains. ¨ Limit the amount of soda and sports drinks you have every day. Drink more water when you are thirsty. ¨ Eat at least 5 servings of fruits and vegetables every day. It may seem like a lot, but it is not hard to reach this goal. A serving or helping is 1 piece of fruit, 1 cup of vegetables, or 2 cups of leafy, raw vegetables. Have an apple or some carrot sticks as an afternoon snack instead of a candy bar. Try to have fruits and/or vegetables at every meal. 
· Make exercise part of your daily routine. You may want to start with simple activities, such as walking, bicycling, or slow swimming. Try to be active 30 to 60 minutes every day. You do not need to do all 30 to 60 minutes all at once. For example, you can exercise 3 times a day for 10 or 20 minutes. Moderate exercise is safe for most people, but it is always a good idea to talk to your doctor before starting an exercise program. 
· Keep moving. Vin Punter the lawn, work in the garden, or [employer]. Take the stairs instead of the elevator at work. · If you smoke, quit. People who smoke have an increased risk for heart attack, stroke, cancer, and other lung illnesses.  Quitting is hard, but there are ways to boost your chance of quitting tobacco for good. ¨ Use nicotine gum, patches, or lozenges. ¨ Ask your doctor about stop-smoking programs and medicines. ¨ Keep trying. In addition to reducing your risk of diseases in the future, you will notice some benefits soon after you stop using tobacco. If you have shortness of breath or asthma symptoms, they will likely get better within a few weeks after you quit. · Limit how much alcohol you drink. Moderate amounts of alcohol (up to 2 drinks a day for men, 1 drink a day for women) are okay. But drinking too much can lead to liver problems, high blood pressure, and other health problems. Family health If you have a family, there are many things you can do together to improve your health. · Eat meals together as a family as often as possible. · Eat healthy foods. This includes fruits, vegetables, lean meats and dairy, and whole grains. · Include your family in your fitness plan. Most people think of activities such as jogging or tennis as the way to fitness, but there are many ways you and your family can be more active. Anything that makes you breathe hard and gets your heart pumping is exercise. Here are some tips: 
¨ Walk to do errands or to take your child to school or the bus. ¨ Go for a family bike ride after dinner instead of watching TV. Where can you learn more? Go to http://bambi-gabby.info/. Enter S036 in the search box to learn more about \"A Healthy Lifestyle: Care Instructions. \" Current as of: May 12, 2017 Content Version: 11.4 © 0227-4022 [employer]. Care instructions adapted under license by [employer] (which disclaims liability or warranty for this information). If you have questions about a medical condition or this instruction, always ask your healthcare professional. [address] any warranty or liability for your use of this information. Introducing [facility]! Dear Reji Garvin: Thank you for requesting a [employer] account. Our records indicate that you already have an active [employer] account. You can access your account anytime at [email or web address] Did you know that you can access your hospital and ER discharge instructions at any time in [employer]? You can also review all of your test results from your hospital stay or ER visit. Additional Information If you have questions, please visit the Frequently Asked Questions section of the [employer] website at [email or web address] Remember, [employer] is NOT to be used for urgent needs. For medical emergencies, dial 911. Now available from your iPhone and Android! Please provide this summary of care documentation to your next provider. Your primary care clinician is listed as Mallika Kirkpatrick. If you have any questions after today's visit, please call [phone].

## 2017-12-20 NOTE — PROGRESS NOTES
Identified pt with two pt identifiers(name and ). Chief Complaint   Patient presents with    Annual Wellness Visit    Labs     fasting        Health Maintenance Due   Topic    Hepatitis C Screening     PAP AKA CERVICAL CYTOLOGY     FOBT Q 1 YEAR AGE 50-75     ZOSTER VACCINE AGE 60>        Wt Readings from Last 3 Encounters:   17 131 lb (59.4 kg)   17 130 lb (59 kg)   17 133 lb (60.3 kg)     Temp Readings from Last 3 Encounters:   17 97.9 °F (36.6 °C) (Oral)   17 98.1 °F (36.7 °C) (Oral)   06/15/17 97.4 °F (36.3 °C)     BP Readings from Last 3 Encounters:   17 128/80   17 117/66   17 121/62     Pulse Readings from Last 3 Encounters:   17 78   17 (!) 57   17 67         Learning Assessment:  :     Learning Assessment 3/10/2017   PRIMARY LEARNER Patient   PRIMARY LANGUAGE ENGLISH   LEARNER PREFERENCE PRIMARY DEMONSTRATION   ANSWERED BY patient   RELATIONSHIP SELF       Depression Screening:  :     PHQ over the last two weeks 2017   Little interest or pleasure in doing things Not at all   Feeling down, depressed or hopeless Not at all   Total Score PHQ 2 0       Fall Risk Assessment:  :     Fall Risk Assessment, last 12 mths 2017   Able to walk? Yes   Fall in past 12 months? No       Abuse Screening:  :     Abuse Screening Questionnaire 2017   Do you ever feel afraid of your partner? N   Are you in a relationship with someone who physically or mentally threatens you? N   Is it safe for you to go home? Y       Coordination of Care Questionnaire:  :     1) Have you been to an emergency room, urgent care clinic since your last visit? no   Hospitalized since your last visit? no             2) Have you seen or consulted any other health care providers outside of [address] [place] [address] since your last visit? no  (Include any pap smears or colon screenings in this section.)    3) Do you have an Advance Directive on file?  no  Are you interested in receiving information about Advance Directives? no    Reviewed record in preparation for visit and have obtained necessary documentation. Medication reconciliation up to date and corrected with patient at this time.

## 2017-12-21 LAB
BASOPHILS # BLD AUTO: 0.1 X10E3/UL (ref 0–0.2)
BASOPHILS NFR BLD AUTO: 1 %
CHOLEST SERPL-MCNC: 230 MG/DL (ref 100–199)
EOSINOPHIL # BLD AUTO: 0.2 X10E3/UL (ref 0–0.4)
EOSINOPHIL NFR BLD AUTO: 4 %
ERYTHROCYTE [DISTWIDTH] IN BLOOD BY AUTOMATED COUNT: 14.2 % (ref 12.3–15.4)
HCT VFR BLD AUTO: 39 % (ref 34–46.6)
HDLC SERPL-MCNC: 76 MG/DL
HGB BLD-MCNC: 13 G/DL (ref 11.1–15.9)
IMM GRANULOCYTES # BLD: 0 X10E3/UL (ref 0–0.1)
IMM GRANULOCYTES NFR BLD: 0 %
INTERPRETATION, 910389: NORMAL
LDLC SERPL CALC-MCNC: 140 MG/DL (ref 0–99)
LYMPHOCYTES # BLD AUTO: 1.7 X10E3/UL (ref 0.7–3.1)
LYMPHOCYTES NFR BLD AUTO: 43 %
MCH RBC QN AUTO: 29.1 PG (ref 26.6–33)
MCHC RBC AUTO-ENTMCNC: 33.3 G/DL (ref 31.5–35.7)
MCV RBC AUTO: 87 FL (ref 79–97)
MONOCYTES # BLD AUTO: 0.4 X10E3/UL (ref 0.1–0.9)
MONOCYTES NFR BLD AUTO: 9 %
NEUTROPHILS # BLD AUTO: 1.7 X10E3/UL (ref 1.4–7)
NEUTROPHILS NFR BLD AUTO: 43 %
PLATELET # BLD AUTO: 184 X10E3/UL (ref 150–379)
RBC # BLD AUTO: 4.46 X10E6/UL (ref 3.77–5.28)
TRIGL SERPL-MCNC: 68 MG/DL (ref 0–149)
VLDLC SERPL CALC-MCNC: 14 MG/DL (ref 5–40)
WBC # BLD AUTO: 3.9 X10E3/UL (ref 3.4–10.8)

## 2018-03-07 ENCOUNTER — TELEPHONE (OUTPATIENT)
Dept: SURGERY | Age: 63
End: 2018-03-07

## 2018-03-07 NOTE — TELEPHONE ENCOUNTER
Patient L/M last week asking about when she should follow-up. She is due for her mammogram and breast MRI after 4/18/18. Per Dr. Kaylen Cates, I recommended an appointment with Branden Benson NP some time in the near future. I told the patient Karyn Betts could coordinate her imaging after that appointment. She was very appreciative of the phone call. I transferred her to a PSR to set up an appointment.

## 2018-04-20 ENCOUNTER — HOSPITAL ENCOUNTER (OUTPATIENT)
Dept: MAMMOGRAPHY | Age: 63
Discharge: HOME | End: 2018-04-20
Attending: NURSE PRACTITIONER
Payer: COMMERCIAL

## 2018-04-20 ENCOUNTER — OFFICE VISIT (OUTPATIENT)
Dept: SURGERY | Age: 63
End: 2018-04-20

## 2018-04-20 VITALS
HEIGHT: 66 IN | BODY MASS INDEX: 21.05 KG/M2 | DIASTOLIC BLOOD PRESSURE: 65 MMHG | WEIGHT: 131 LBS | HEART RATE: 69 BPM | SYSTOLIC BLOOD PRESSURE: 121 MMHG

## 2018-04-20 DIAGNOSIS — N60.11 FIBROCYSTIC BREAST CHANGES OF BOTH BREASTS: Primary | ICD-10-CM

## 2018-04-20 DIAGNOSIS — N60.92 ATYPICAL DUCTAL HYPERPLASIA OF LEFT BREAST: ICD-10-CM

## 2018-04-20 DIAGNOSIS — Z91.89 AT HIGH RISK FOR BREAST CANCER: ICD-10-CM

## 2018-04-20 DIAGNOSIS — N60.11 FIBROCYSTIC BREAST CHANGES OF BOTH BREASTS: ICD-10-CM

## 2018-04-20 DIAGNOSIS — N60.12 FIBROCYSTIC BREAST CHANGES OF BOTH BREASTS: Primary | ICD-10-CM

## 2018-04-20 DIAGNOSIS — N60.12 FIBROCYSTIC BREAST CHANGES OF BOTH BREASTS: ICD-10-CM

## 2018-04-20 DIAGNOSIS — Z80.3 FAMILY HISTORY OF BREAST CANCER: ICD-10-CM

## 2018-04-20 PROCEDURE — 77063 BREAST TOMOSYNTHESIS BI: CPT

## 2018-04-20 NOTE — PROGRESS NOTES
HISTORY OF PRESENT ILLNESS  Ana Luisa Husain is a 58 y.o. female. Other     ESTABLISHED patient here today for follow up due to history of ADH. Denies any breast problems at this time. Breast history-   6/15/17: LT breast excisional bx of extensive sclerosing adenosis with focal atypical duct hyperplasia. Clear margins. 17: LT breast (11:00) core bx of ADH. Patient has also had numerous bilateral breast biopsies, all benign (including a right breast fibroadenoma). OB History      Para Term  AB Living    3         SAB TAB Ectopic Molar Multiple Live Births                 Obstetric Comments    Menarche:14  . LMP: 36.  # of Children:  3. Age at Delivery of First Child:  22.   Hysterectomy/oophorectomy:  YES/NO. Breast Bx:  yes. Hx of Breast Feeding:  yes. BCP:  yes. Hormone therapy:  no.        Past Surgical History:   Procedure Laterality Date    BREAST SURGERY PROCEDURE UNLISTED      fibroadadenoma RIGHT     HX BREAST BIOPSY Bilateral over years    several stereotactic bx all neg    HX BREAST BIOPSY Right yrs ago    neg; surgical bx    HX BREAST BIOPSY Left 6/15/2017    LEFT BREAST EXCISIONAL BIOPSY WITH NEEDLE LOCALIZATION  performed by Loli Eaton MD at [address] HX HYSTERECTOMY      HX OTHER SURGICAL      LEFT AXILLARY NODE REMOVED    HX TUBAL LIGATION       FH includes-  Sister diagnosed age 43 with breast cancer  at 50  Sister diagnosed with breast cancer age 54, still living (her daughter had genetic testing, which was negative). 3/10/17 - [employer]rer Cuzick model - Her 10 year risk is 15.5% (compared with a population risk of 3.6%). Her lifetime risk is 34% (compared with a population risk of 8.7%)    No imaging since surgery    ROS    Physical Exam   Constitutional: She appears well-developed and well-nourished. Pulmonary/Chest: Right breast exhibits no inverted nipple, no mass, no nipple discharge, no skin change and no tenderness.  Left breast exhibits no inverted nipple, no mass, no nipple discharge, no skin change and no tenderness. Breasts are symmetrical.   Well healed surgical scars bilaterally   Musculoskeletal: Normal range of motion. UE x 2   Lymphadenopathy:     She has no cervical adenopathy. She has no axillary adenopathy. Right: No supraclavicular adenopathy present. Left: No supraclavicular adenopathy present. Skin: Skin is warm, dry and intact. Chest and breasts examined   Psychiatric: She has a normal mood and affect. Her speech is normal and behavior is normal.     Visit Vitals    /65    Pulse 69    Ht 5' 6\" (1.676 m)    Wt 131 lb (59.4 kg)    BMI 21.14 kg/m2     ASSESSMENT and PLAN  Encounter Diagnoses   Name Primary?  Fibrocystic breast changes of both breasts Yes    Atypical ductal hyperplasia of left breast     At high risk for breast cancer     Family history of breast cancer      Normal exam.  Discussed personal and family history and her risk. She will plan to have a BSmammogram 3D now and then we will evaluate for a breast MRI. She wants to be prudent in her breast evaluation, but also feels that every time she gets a breast MRI, further evaluation and intervention is needed. We will wait for the mammogram results before she makes a decision about her MRI. She will plan to have a breast MRI in 10/2018 if needed and RTC here in 1 year or sooner PRN. She is comfortable with this plan. All questions answered and she stated understanding.

## 2018-04-20 NOTE — PATIENT INSTRUCTIONS
Breast Self-Exam: Care Instructions  Your Care Instructions    A breast self-exam is when you check your breasts for lumps or changes. This regular exam helps you learn how your breasts normally look and feel. Most breast problems or changes are not because of cancer. Breast self-exam is not a substitute for a mammogram. Having regular breast exams by your doctor and regular mammograms improve your chances of finding any problems with your breasts. Some women set a time each month to do a step-by-step breast self-exam. Other women like a less formal system. They might look at their breasts as they brush their teeth, or feel their breasts once in a while in the shower. If you notice a change in your breast, tell your doctor. Follow-up care is a key part of your treatment and safety. Be sure to make and go to all appointments, and call your doctor if you are having problems. It's also a good idea to know your test results and keep a list of the medicines you take. How do you do a breast self-exam?  · The best time to examine your breasts is usually one week after your menstrual period begins. Your breasts should not be tender then. If you do not have periods, you might do your exam on a day of the month that is easy to remember. · To examine your breasts:  ¨ Remove all your clothes above the waist and lie down. When you are lying down, your breast tissue spreads evenly over your chest wall, which makes it easier to feel all your breast tissue. ¨ Use the pads-not the fingertips-of the 3 middle fingers of your left hand to check your right breast. Move your fingers slowly in small coin-sized circles that overlap. ¨ Use three levels of pressure to feel of all your breast tissue. Use light pressure to feel the tissue close to the skin surface. Use medium pressure to feel a little deeper. Use firm pressure to feel your tissue close to your breastbone and ribs.  Use each pressure level to feel your breast tissue before moving on to the next spot. ¨ Check your entire breast, moving up and down as if following a strip from the collarbone to the bra line, and from the armpit to the ribs. Repeat until you have covered the entire breast.  ¨ Repeat this procedure for your left breast, using the pads of the 3 middle fingers of your right hand. · To examine your breasts while in the shower:  ¨ Place one arm over your head and lightly soap your breast on that side. ¨ Using the pads of your fingers, gently move your hand over your breast (in the strip pattern described above), feeling carefully for any lumps or changes. ¨ Repeat for the other breast.  · Have your doctor inspect anything you notice to see if you need further testing. Where can you learn more? Go to http://bambi-gabby.info/. Enter P148 in the search box to learn more about \"Breast Self-Exam: Care Instructions. \"  Current as of: May 12, 2017  Content Version: 11.4  © 4372-6187 [employer]. Care instructions adapted under license by [employer] (which disclaims liability or warranty for this information). If you have questions about a medical condition or this instruction, always ask your healthcare professional. [place] [address] any warranty or liability for your use of this information.

## 2018-04-20 NOTE — PROGRESS NOTES
HISTORY OF PRESENT ILLNESS  Arin Mack is a 58 y.o. female. HPI   ESTABLISHED patient here today for follow up due to history of ADH. Denies any breast problems at this time. Breast history-   17: LT breast (11:00) core bx of ADH. 6/15/17: LT breast excisional bx of extensive sclerosing adenosis with focal atypical duct hyperplasia. Clear margins. FH includes-  Sister diagnosed age 43 with breast cancer  at 50  Sister diagnosed with breast cancer age 54, still living (her daughter had genetic testing, which was negative). 3/10/17 - Tyrgage Olsenzick model - Her 10 year risk is 15.5% (compared with a population risk of 3.6%).  Her lifetime risk is 34% (compared with a population risk of 8.7%)    No imaging since surgery    ROS    Physical Exam    ASSESSMENT and PLAN  {ASSESSMENT/PLAN:72454}

## 2018-04-26 ENCOUNTER — TELEPHONE (OUTPATIENT)
Dept: SURGERY | Age: 63
End: 2018-04-26

## 2018-04-26 NOTE — TELEPHONE ENCOUNTER
Called to discuss recent mammogram and high risk screening breast MRI. No answer - left message. Will try patient tomorrow.

## 2018-04-27 ENCOUNTER — TELEPHONE (OUTPATIENT)
Dept: SURGERY | Age: 63
End: 2018-04-27

## 2018-04-27 NOTE — TELEPHONE ENCOUNTER
Spoke with patient regarding mammogram results - normal.  Discussed breasts MRIs and she wishes to forego the breast MRI this year. We will discuss if additional MRIs are necessary at her next appointment which is reasonable. She is comfortable with this plan. All questions answered and she stated understanding.

## 2019-02-06 ENCOUNTER — TELEPHONE (OUTPATIENT)
Dept: SURGERY | Age: 64
End: 2019-02-06

## 2019-02-06 DIAGNOSIS — Z12.31 VISIT FOR SCREENING MAMMOGRAM: Primary | ICD-10-CM

## 2019-02-06 NOTE — TELEPHONE ENCOUNTER
Placed order for patient for annual screening 3D mammogram for after 4/20/19. Called patient, but she was not available, so I L/M on her phone that this was done. She should have the mammogram 4/20/19 or later.

## 2019-04-15 DIAGNOSIS — Z12.39 BREAST CANCER SCREENING: Primary | ICD-10-CM

## 2019-04-25 NOTE — PROGRESS NOTES
HISTORY OF PRESENT ILLNESS  Ernestine Chambers is a 61 y.o. female. HPI  ESTABLISHED patient here for annual follow up for history of ADH. Feels a lump to her RIGHT lower, outer breast near rib cage. Has felt it for a few weeks. Breast history-   6/15/17: LT breast excisional bx of extensive sclerosing adenosis with focal atypical duct hyperplasia. Clear margins. 17: LT breast (11:00) core bx of ADH. Patient has also had numerous bilateral breast biopsies, all benign (including a right breast fibroadenoma). OB History        3    Para        Term                AB        Living           SAB        TAB        Ectopic        Molar        Multiple        Live Births              Obstetric Comments   Menarche:14  . LMP: 36.  # of Children:  3. Age at Delivery of First Child:  22.   Hysterectomy/oophorectomy:  YES/NO. Breast Bx:  yes. Hx of Breast Feeding:  yes. BCP:  yes. Hormone therapy:  no.           Past Surgical History:   Procedure Laterality Date    BREAST SURGERY PROCEDURE UNLISTED      fibroadadenoma RIGHT     HX BREAST BIOPSY Bilateral over years    several stereotactic bx all neg    HX BREAST BIOPSY Right yrs ago    neg; surgical bx    HX BREAST BIOPSY Left 6/15/2017    LEFT BREAST EXCISIONAL BIOPSY WITH NEEDLE LOCALIZATION  performed by Adina Jeffers MD at [address] HX HYSTERECTOMY      HX OTHER SURGICAL      LEFT AXILLARY NODE REMOVED    HX TUBAL LIGATION       FH includes-  Sister diagnosed age 43 with breast cancer  at 50  Sister diagnosed with breast cancer age 54, still living (her daughter had genetic testing, which was negative). 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 Her lifetime risk is 34% (compared with a population risk of 8.7%)      Mammogram, 19, BIRADS - scheduled today. ROS    Physical Exam   Constitutional: She appears well-developed and well-nourished. Pulmonary/Chest: Right breast exhibits tenderness. Right breast exhibits no inverted nipple, no mass, no nipple discharge and no skin change. Left breast exhibits no inverted nipple, no mass, no nipple discharge, no skin change and no tenderness. Breasts are symmetrical.       Musculoskeletal: Normal range of motion. UE x 2   Lymphadenopathy:     She has no axillary adenopathy. Right: No supraclavicular adenopathy present. Left: No supraclavicular adenopathy present. Skin: Skin is warm, dry and intact. Chest and breasts examined   Psychiatric: She has a normal mood and affect. Her speech is normal and behavior is normal.     Visit Vitals  /75   Pulse (!) 59   Ht 5' 6\" (1.676 m)   Wt 129 lb (58.5 kg)   BMI 20.82 kg/m²     ASSESSMENT and PLAN  Encounter Diagnoses   Name Primary?  Fibrocystic breast changes of both breasts Yes    Atypical ductal hyperplasia of left breast     At high risk for breast cancer     Family history of breast cancer      Normal exam in this high risk patient. She will have a BSmamogram 3D today. Discussed additional imaging with breast MRI. Due to her personal history of ADH, family history and heterogeneously dense breasts, she will have an MRI this year. Discussed having them every other year. Order placed for MRI. Right breast tenderness - exam normal.  Patient thought she previously felt a mass here, but cannot palpate anything today. Area will be evaluated on breast MRI. RTC in 1 year or sooner PRN. She is comfortable with this plan. All questions answered and she stated understanding.

## 2019-04-26 ENCOUNTER — TELEPHONE (OUTPATIENT)
Dept: SURGERY | Age: 64
End: 2019-04-26

## 2019-04-26 ENCOUNTER — OFFICE VISIT (OUTPATIENT)
Dept: SURGERY | Age: 64
End: 2019-04-26

## 2019-04-26 ENCOUNTER — HOSPITAL ENCOUNTER (OUTPATIENT)
Dept: MAMMOGRAPHY | Age: 64
Discharge: HOME | End: 2019-04-26
Attending: NURSE PRACTITIONER
Payer: COMMERCIAL

## 2019-04-26 VITALS
HEIGHT: 66 IN | SYSTOLIC BLOOD PRESSURE: 130 MMHG | DIASTOLIC BLOOD PRESSURE: 75 MMHG | HEART RATE: 59 BPM | WEIGHT: 129 LBS | BODY MASS INDEX: 20.73 KG/M2

## 2019-04-26 DIAGNOSIS — Z12.39 BREAST CANCER SCREENING: ICD-10-CM

## 2019-04-26 DIAGNOSIS — N60.12 FIBROCYSTIC BREAST CHANGES OF BOTH BREASTS: Primary | ICD-10-CM

## 2019-04-26 DIAGNOSIS — N60.11 FIBROCYSTIC BREAST CHANGES OF BOTH BREASTS: Primary | ICD-10-CM

## 2019-04-26 DIAGNOSIS — Z80.3 FAMILY HISTORY OF BREAST CANCER: ICD-10-CM

## 2019-04-26 DIAGNOSIS — N60.92 ATYPICAL DUCTAL HYPERPLASIA OF LEFT BREAST: ICD-10-CM

## 2019-04-26 DIAGNOSIS — Z91.89 AT HIGH RISK FOR BREAST CANCER: ICD-10-CM

## 2019-04-26 PROCEDURE — 77063 BREAST TOMOSYNTHESIS BI: CPT

## 2019-04-26 NOTE — PATIENT INSTRUCTIONS

## 2019-05-10 ENCOUNTER — HOSPITAL ENCOUNTER (OUTPATIENT)
Dept: MRI IMAGING | Age: 64
Discharge: HOME | End: 2019-05-10
Attending: NURSE PRACTITIONER
Payer: COMMERCIAL

## 2019-05-10 ENCOUNTER — TELEPHONE (OUTPATIENT)
Dept: SURGERY | Age: 64
End: 2019-05-10

## 2019-05-10 DIAGNOSIS — Z91.89 AT HIGH RISK FOR BREAST CANCER: ICD-10-CM

## 2019-05-10 DIAGNOSIS — N60.92 ATYPICAL DUCTAL HYPERPLASIA OF LEFT BREAST: ICD-10-CM

## 2019-05-10 DIAGNOSIS — Z80.3 FAMILY HISTORY OF BREAST CANCER: ICD-10-CM

## 2019-05-10 DIAGNOSIS — N60.11 FIBROCYSTIC BREAST CHANGES OF BOTH BREASTS: ICD-10-CM

## 2019-05-10 DIAGNOSIS — N60.12 FIBROCYSTIC BREAST CHANGES OF BOTH BREASTS: ICD-10-CM

## 2019-05-10 PROCEDURE — 77049 MRI BREAST C-+ W/CAD BI: CPT

## 2019-05-10 PROCEDURE — A9585 GADOBUTROL INJECTION: HCPCS | Performed by: NURSE PRACTITIONER

## 2019-05-10 PROCEDURE — 74011250636 HC RX REV CODE- 250/636: Performed by: NURSE PRACTITIONER

## 2019-05-10 RX ADMIN — GADOBUTROL 6 ML: 604.72 INJECTION INTRAVENOUS at 09:53

## 2019-06-18 NOTE — MR AVS SNAPSHOT
Visit Information Date & Time Provider Department Dept. Phone Encounter #  
 5/15/2017  9:53 AM Mckayla Valadez MD [facility] [phone] [id number] Upcoming Health Maintenance Date Due Hepatitis C Screening 1955 DTaP/Tdap/Td series (1 - Tdap) 9/15/1976 PAP AKA CERVICAL CYTOLOGY 9/15/1976 FOBT Q 1 YEAR AGE 50-75 9/15/2005 ZOSTER VACCINE AGE 60> 9/15/2015 INFLUENZA AGE 9 TO ADULT 8/1/2017 BREAST CANCER SCRN MAMMOGRAM 4/18/2019 Allergies as of 5/15/2017  Review Complete On: 5/15/2017 By: Tennille Tyler RN No Known Allergies Current Immunizations  Never Reviewed No immunizations on file. Not reviewed this visit You Were Diagnosed With   
  
 Codes Comments Atypical ductal hyperplasia of left breast    -  Primary ICD-10-CM: N60.92 
ICD-9-CM: 610.8 Family history of breast cancer     ICD-10-CM: Z80.3 ICD-9-CM: V16.3 Vitals BP Pulse Height(growth percentile) Weight(growth percentile) BMI OB Status 124/59 61 5' 6\" (1.676 m) 134 lb (60.8 kg) 21.63 kg/m2 Hysterectomy Smoking Status Never Smoker BMI and BSA Data Body Mass Index Body Surface Area  
 21.63 kg/m 2 1.68 m 2 Your Updated Medication List  
  
   
This list is accurate as of: 5/15/17  3:59 PM.  Always use your most recent med list. ADVIL 100 mg tablet Generic drug:  ibuprofen Take 100 mg by mouth every six (6) hours as needed for Pain.  
  
 loratadine 10 mg tablet Commonly known as:  Alben Jobs Take 10 mg by mouth. RESTASIS 0.05 % ophthalmic emulsion Generic drug:  cycloSPORINE Administer 1 drop to both eyes two (2) times a day. We Performed the Following [facility]NAL[facility] [[id number] Custom] Comments:  
 Blood sample obtained for genetic testing. Josey. BRCA Plus. Fed Ex confirmation  #[id number]. Patient Instructions Breast Self-Exam: Care Instructions Your Care Instructions A breast self-exam is when you check your breasts for lumps or changes. This regular exam helps you learn how your breasts normally look and feel. Most breast problems or changes are not because of cancer. Breast self-exam is not a substitute for a mammogram. Having regular breast exams by your doctor and regular mammograms improve your chances of finding any problems with your breasts. Some women set a time each month to do a step-by-step breast self-exam. Other women like a less formal system. They might look at their breasts as they brush their teeth, or feel their breasts once in a while in the shower. If you notice a change in your breast, tell your doctor. Follow-up care is a key part of your treatment and safety. Be sure to make and go to all appointments, and call your doctor if you are having problems. Its also a good idea to know your test results and keep a list of the medicines you take. How do you do a breast self-exam? 
· The best time to examine your breasts is usually one week after your menstrual period begins. Your breasts should not be tender then. If you do not have periods, you might do your exam on a day of the month that is easy to remember. · To examine your breasts: ¨ Remove all your clothes above the waist and lie down. When you are lying down, your breast tissue spreads evenly over your chest wall, which makes it easier to feel all your breast tissue. ¨ Use the padsnot the fingertipsof the 3 middle fingers of your left hand to check your right breast. Move your fingers slowly in small coin-sized circles that overlap. ¨ Use three levels of pressure to feel of all your breast tissue. Use light pressure to feel the tissue close to the skin surface. Use medium pressure to feel a little deeper. Use firm pressure to feel your tissue close to your breastbone and ribs.  Use each pressure level to feel your breast tissue before moving on to the next spot. ¨ Check your entire breast, moving up and down as if following a strip from the collarbone to the bra line, and from the armpit to the ribs. Repeat until you have covered the entire breast. 
¨ Repeat this procedure for your left breast, using the pads of the 3 middle fingers of your right hand. · To examine your breasts while in the shower: 
¨ Place one arm over your head and lightly soap your breast on that side. ¨ Using the pads of your fingers, gently move your hand over your breast (in the strip pattern described above), feeling carefully for any lumps or changes. ¨ Repeat for the other breast. 
· Have your doctor inspect anything you notice to see if you need further testing. Where can you learn more? Go to http://bambi-gabby.info/. Enter P148 in the search box to learn more about \"Breast Self-Exam: Care Instructions. \" Current as of: July 26, 2016 Content Version: 11.2 © 9668-2770 [employer]. Care instructions adapted under license by [employer] (which disclaims liability or warranty for this information). If you have questions about a medical condition or this instruction, always ask your healthcare professional. [place] [address] any warranty or liability for your use of this information. Introducing [facility]! Kelly Brewster introduces [employer] patient portal. Now you can access parts of your medical record, email your doctor's office, and request medication refills online. 1. In your internet browser, go to [email or web address] 2. Click on the First Time User? Click Here link in the Sign In box. You will see the New Member Sign Up page. 3. Enter your [employer] Access Code exactly as it appears below. You will not need to use this code after youve completed the sign-up process. If you do not sign up before the expiration date, you must request a new code. · [employer] Access Code: [email or web address] Expires: 6/8/2017  9:32 AM 
 
4. Enter the last four digits of your Social Security Number (xxxx) and Date of Birth (mm/dd/yyyy) as indicated and click Submit. You will be taken to the next sign-up page. 5. Create a [employer] ID. This will be your [employer] login ID and cannot be changed, so think of one that is secure and easy to remember. 6. Create a [employer] password. You can change your password at any time. 7. Enter your Password Reset Question and Answer. This can be used at a later time if you forget your password. 8. Enter your e-mail address. You will receive e-mail notification when new information is available in [address] 9. Click Sign Up. You can now view and download portions of your medical record. 10. Click the Download Summary menu link to download a portable copy of your medical information. If you have questions, please visit the Frequently Asked Questions section of the [employer] website. Remember, [employer] is NOT to be used for urgent needs. For medical emergencies, dial 911. Now available from your iPhone and Android! Please provide this summary of care documentation to your next provider. Your primary care clinician is listed as NONE. If you have any questions after today's visit, please call [phone]. [employer] Pregnancy And Lactation Text: This medication is Pregnancy Category X and contraindicated in pregnancy and in women who may become pregnant. It is unknown if this medication is excreted in breast milk.

## 2020-07-10 ENCOUNTER — OFFICE VISIT (OUTPATIENT)
Dept: SURGERY | Age: 65
End: 2020-07-10

## 2020-07-10 VITALS
DIASTOLIC BLOOD PRESSURE: 67 MMHG | WEIGHT: 124 LBS | SYSTOLIC BLOOD PRESSURE: 107 MMHG | HEIGHT: 66 IN | HEART RATE: 86 BPM | BODY MASS INDEX: 19.93 KG/M2 | TEMPERATURE: 97.4 F

## 2020-07-10 DIAGNOSIS — N60.12 FIBROCYSTIC BREAST CHANGES OF BOTH BREASTS: Primary | ICD-10-CM

## 2020-07-10 DIAGNOSIS — N60.11 FIBROCYSTIC BREAST CHANGES OF BOTH BREASTS: Primary | ICD-10-CM

## 2020-07-10 DIAGNOSIS — N60.92 ATYPICAL DUCTAL HYPERPLASIA OF LEFT BREAST: ICD-10-CM

## 2020-07-10 DIAGNOSIS — Z91.89 AT HIGH RISK FOR BREAST CANCER: ICD-10-CM

## 2020-07-10 RX ORDER — ESTRADIOL 0.1 MG/G
2 CREAM VAGINAL DAILY
COMMUNITY

## 2020-07-10 NOTE — PROGRESS NOTES
HISTORY OF PRESENT ILLNESS  Skylar Zheng is a 59 y.o. female. HPI ESTABLISHED patient here for annual follow up for history of ADH. Denies breast mass, skin changes, nipple discharge and pain. Breast history-   6/15/17: LT breast excisional bx of extensive sclerosing adenosis with focal atypical duct hyperplasia. Clear margins. 17: LT breast (11:00) core bx of ADH. Patient has also had numerous bilateral breast biopsies, all benign (including a right breast fibroadenoma).       FH includes-  Sister diagnosed age 43 with breast cancer  at 50  Sister diagnosed with breast cancer age 54, still living (her daughter had genetic testing, which was negative). 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 Her lifetime risk is 34% (compared with a population risk of 8.7%)    OB History        3    Para        Term                AB        Living           SAB        TAB        Ectopic        Molar        Multiple        Live Births              Obstetric Comments   Menarche:14  . LMP: 36.  # of Children:  3. Age at Delivery of First Child:  22.   Hysterectomy/oophorectomy:  YES/NO. Breast Bx:  yes. Hx of Breast Feeding:  yes. BCP:  yes. Hormone therapy:  no.           Past Surgical History:   Procedure Laterality Date    BREAST SURGERY PROCEDURE UNLISTED      fibroadadenoma RIGHT     HX BREAST BIOPSY Bilateral over years    several stereotactic bx all neg    HX BREAST BIOPSY Right yrs ago    neg; surgical bx    HX BREAST BIOPSY Left 6/15/2017    LEFT BREAST EXCISIONAL BIOPSY WITH NEEDLE LOCALIZATION  performed by Carly Franz MD at [address] HX HYSTERECTOMY      HX OTHER SURGICAL      LEFT AXILLARY NODE REMOVED    HX TUBAL LIGATION       ROS    Physical Exam  Constitutional:       Appearance: Normal appearance. Chest:      Breasts:         Right: No mass, nipple discharge, skin change or tenderness. Left: No mass, nipple discharge, skin change or tenderness. Musculoskeletal: Normal range of motion. Comments: UE x 2   Lymphadenopathy:      Upper Body:      Right upper body: No supraclavicular or axillary adenopathy. Left upper body: No supraclavicular or axillary adenopathy. Neurological:      Mental Status: She is alert. Psychiatric:         Attention and Perception: Attention normal.         Mood and Affect: Mood normal.         Speech: Speech normal.         Behavior: Behavior normal.       Visit Vitals  /67 (BP 1 Location: Left arm, BP Patient Position: Sitting)   Pulse 86   Temp 97.4 °F (36.3 °C) (Skin)   Ht 5' 6\" (1.676 m)   Wt 124 lb (56.2 kg)   BMI 20.01 kg/m²     ASSESSMENT and PLAN  Encounter Diagnoses   Name Primary?  Fibrocystic breast changes of both breasts Yes    Atypical ductal hyperplasia of left breast     At high risk for breast cancer      Normal exam with no evidence of local malignancy. Recent unplanned weight loss - will follow-up with PCP if persists. BDmammogram 3D in a couple of weeks. Discussed breast MRI risk, benefits and information provided - would like to have every other year. Will plan for 5/2021. She will call before for an order and to schedule. RTC in 1 year or sooner PRN. She is comfortable with this plan. All questions answered and she stated understanding.

## 2020-07-10 NOTE — PATIENT INSTRUCTIONS

## 2020-07-24 ENCOUNTER — HOSPITAL ENCOUNTER (OUTPATIENT)
Dept: MAMMOGRAPHY | Age: 65
Discharge: HOME | End: 2020-07-24
Attending: NURSE PRACTITIONER
Payer: COMMERCIAL

## 2020-07-24 DIAGNOSIS — Z12.31 VISIT FOR SCREENING MAMMOGRAM: ICD-10-CM

## 2020-07-24 PROCEDURE — 77063 BREAST TOMOSYNTHESIS BI: CPT

## 2021-05-04 ENCOUNTER — TELEPHONE (OUTPATIENT)
Dept: SURGERY | Age: 66
End: 2021-05-04

## 2021-05-04 NOTE — TELEPHONE ENCOUNTER
Patient called and left a message requesting MRI order for this year. Will send to Nik Ocampo NP to order and call patient back tomorrow.

## 2021-05-05 ENCOUNTER — TELEPHONE (OUTPATIENT)
Dept: SURGERY | Age: 66
End: 2021-05-05

## 2021-05-05 DIAGNOSIS — Z12.31 ENCOUNTER FOR SCREENING MAMMOGRAM FOR BREAST CANCER: Primary | ICD-10-CM

## 2021-05-05 DIAGNOSIS — Z91.89 AT HIGH RISK FOR BREAST CANCER: Primary | ICD-10-CM

## 2021-05-24 ENCOUNTER — HOSPITAL ENCOUNTER (OUTPATIENT)
Dept: MRI IMAGING | Age: 66
Discharge: HOME | End: 2021-05-24
Attending: NURSE PRACTITIONER
Payer: COMMERCIAL

## 2021-05-24 ENCOUNTER — PATIENT MESSAGE (OUTPATIENT)
Dept: SURGERY | Age: 66
End: 2021-05-24

## 2021-05-24 DIAGNOSIS — Z91.89 AT HIGH RISK FOR BREAST CANCER: ICD-10-CM

## 2021-05-24 LAB — CREAT BLD-MCNC: 1.1 MG/DL (ref 0.6–1.3)

## 2021-05-24 PROCEDURE — 74011250636 HC RX REV CODE- 250/636: Performed by: NURSE PRACTITIONER

## 2021-05-24 PROCEDURE — 82565 ASSAY OF CREATININE: CPT

## 2021-05-24 PROCEDURE — C8937 CAD BREAST MRI: HCPCS

## 2021-05-24 PROCEDURE — A9585 GADOBUTROL INJECTION: HCPCS | Performed by: NURSE PRACTITIONER

## 2021-05-24 RX ADMIN — GADOBUTROL 5 ML: 604.72 INJECTION INTRAVENOUS at 09:55

## 2021-05-25 ENCOUNTER — TELEPHONE (OUTPATIENT)
Dept: SURGERY | Age: 66
End: 2021-05-25

## 2021-07-30 ENCOUNTER — OFFICE VISIT (OUTPATIENT)
Dept: SURGERY | Age: 66
End: 2021-07-30
Payer: COMMERCIAL

## 2021-07-30 VITALS
SYSTOLIC BLOOD PRESSURE: 114 MMHG | BODY MASS INDEX: 20.09 KG/M2 | HEIGHT: 66 IN | WEIGHT: 125 LBS | DIASTOLIC BLOOD PRESSURE: 53 MMHG

## 2021-07-30 DIAGNOSIS — Z12.31 ENCOUNTER FOR SCREENING MAMMOGRAM FOR BREAST CANCER: ICD-10-CM

## 2021-07-30 DIAGNOSIS — Z91.89 AT HIGH RISK FOR BREAST CANCER: ICD-10-CM

## 2021-07-30 DIAGNOSIS — N60.12 FIBROCYSTIC BREAST CHANGES OF BOTH BREASTS: ICD-10-CM

## 2021-07-30 DIAGNOSIS — N60.92 ATYPICAL DUCTAL HYPERPLASIA OF LEFT BREAST: Primary | ICD-10-CM

## 2021-07-30 DIAGNOSIS — N60.11 FIBROCYSTIC BREAST CHANGES OF BOTH BREASTS: ICD-10-CM

## 2021-07-30 PROCEDURE — 99213 OFFICE O/P EST LOW 20 MIN: CPT | Performed by: NURSE PRACTITIONER

## 2021-07-30 NOTE — PATIENT INSTRUCTIONS

## 2021-07-30 NOTE — PROGRESS NOTES
HISTORY OF PRESENT ILLNESS  Raeann Campos is a 72 y.o. female. HPI ESTABLISHED patient here for annual follow up for history of ADH. Denies breast mass, skin changes, nipple discharge and pain.       Breast history-   6/15/17: LT breast excisional bx of extensive sclerosing adenosis with focal atypical duct hyperplasia. Clear margins. 17: LT breast (11:00) core bx of ADH. Patient has also had numerous bilateral breast biopsies, all benign (including a right breast fibroadenoma).         FH includes-  Sister diagnosed age 43 with breast cancer  at 50  Sister diagnosed with breast cancer age 54, still living (her daughter had genetic testing, which was negative). Niece - diagnosed with breast cancer at 29  3/10/17 - Scott Deisi- Her 10 year risk is 15.5% (compared with a population risk of 3.6%). Her lifetime risk is 34% (compared with a population risk of 8.7%)      OB History        3    Para        Term                AB        Living           SAB        TAB        Ectopic        Molar        Multiple        Live Births              Obstetric Comments   Menarche:14  . LMP: 36.  # of Children:  3. Age at Delivery of First Child:  22.   Hysterectomy/oophorectomy:  YES/NO. Breast Bx:  yes. Hx of Breast Feeding:  yes. BCP:  yes.  Hormone therapy:  no.               Past Surgical History:   Procedure Laterality Date    BREAST SURGERY PROCEDURE UNLISTED      fibroadadenoma RIGHT     HX BREAST BIOPSY Bilateral over years    several stereotactic bx all neg    HX BREAST BIOPSY Right yrs ago    neg; surgical bx    HX BREAST BIOPSY Left 6/15/2017    LEFT BREAST EXCISIONAL BIOPSY WITH NEEDLE LOCALIZATION  performed by Shannan Meyer MD at [address] HX HYSTERECTOMY      HX OTHER SURGICAL      LEFT AXILLARY NODE REMOVED    HX TUBAL LIGATION           BENNIE Results (most recent):  Results from Hospital Encounter encounter on 20    BENNIE 3D PITA W MAMMO BI SCREENING INCL CAD    Narrative  STUDY: Bilateral digital screening mammogram with 3-D tomosynthesis    INDICATION:  Screening. COMPARISON: [facility] dating back to 2016    BREAST COMPOSITION: The breasts are heterogeneously dense, which may obscure  small masses. FINDINGS: Bilateral digital screening mammography was performed and is  interpreted in conjunction with a computer assisted detection (CAD) system. Additionally, tomosynthesis of both breasts in the CC and MLO projections was  performed. No suspicious masses or calcifications are identified. Multiple  biopsy clips are again seen in both breasts. Coarse, dystrophic, and punctate  calcifications in both breasts are not significantly changed. Impression  IMPRESSION:  BI-RADS 2: Benign. No mammographic evidence of malignancy. Benign appearing  4calcifications in both breasts are not significantly changed. RECOMMENDATIONS:  Next screening mammogram is recommended in one year. The patient will be notified of these results. MRI Results (most recent):  Results from [place] encounter on 05/24/21    MRI BREAST BI W WO CONT    Narrative  EXAM:  MRI BREAST BI W WO CONT    INDICATION: High-risk screening. Life time risk greater than 20%. 2 sisters with  breast carcinoma. COMPARISON: MRI breast on 5/10/2019. Mammograms on 7/24/2020 and 2/20/2016. EXAM: MRI of right and left breast without and with IV contrast Gadavist .    TECHNIQUE: MRI breast without and with IV contrast. Bilateral breast MRI was  performed using a dedicated breast coil without compression with the patient in  the prone position. Precontrast T1-weighted images with fat suppression were  obtained followed by bolus injection of 5 mL of Gadavist . Postcontrast dynamic  and high-resolution images were acquired. Axial T2 fat-saturated imaging was  also performed.  The images were analyzed using CAD analysis, enhancement curves,  digital subtraction, and 2 and 3 dimensional reconstructions. FINDINGS:    Background parenchymal enhancement: Mild. Lymph nodes: No lymphadenopathy. Right breast: There is no suspicious focus of morphology or temporal  enhancement. Intrinsic hyperintense T1 signal in the ducts is unchanged. Coarse  calcifications are unchanged. Biopsy clip is unchanged. Normal skin and nipple. Left breast: There is no suspicious focus of morphology or temporal enhancement. Intrinsic hyperintense T1 signal in the ducts is unchanged. Coarse  calcifications and biopsy clips are unchanged. Normal skin and nipple. Miscellaneous: None. Impression  No MRI evidence of malignancy. No change. Assessment: ACR BI-RADS category  Right breast: BI-RADS Assessment Category 2: Benign finding. Left breast: BI-RADS Assessment Category 2: Benign finding. Recommendation: Screening three-dimensional mammography at end of July, 2021. Screening MRI breast in one year. A negative breast MRI examination speaks strongly against invasive cancer down  to a detection threshold of 3 to 5 mm but may not detect some lower grade or in  situ carcinomas. Therefore, routine clinical and mammographic followup are  recommended. A summary portfolio has been created in PACS. ROS    Physical Exam  Constitutional:       Appearance: Normal appearance. Chest:      Breasts:         Right: No mass, nipple discharge, skin change or tenderness. Left: No mass, nipple discharge, skin change or tenderness. Musculoskeletal:      Comments: FROM - UE x 2   Lymphadenopathy:      Upper Body:      Right upper body: No supraclavicular or axillary adenopathy. Left upper body: No supraclavicular or axillary adenopathy. Neurological:      Mental Status: She is alert.    Psychiatric:         Attention and Perception: Attention normal.         Mood and Affect: Mood normal.         Speech: Speech normal.         Behavior: Behavior normal.         Visit Vitals  BP (!) 114/53 (BP 1 Location: Left upper arm, BP Patient Position: Sitting, BP Cuff Size: Adult)   Ht 5' 6\" (1.676 m)   Wt 125 lb (56.7 kg)   BMI 20.18 kg/m²         ASSESSMENT and PLAN  Encounter Diagnoses   Name Primary?  Atypical ductal hyperplasia of left breast Yes    At high risk for breast cancer     Fibrocystic breast changes of both breasts     Encounter for screening mammogram for breast cancer         Normal exam with no evidence of local malignancy. BDmammogram 3D - scheduled for 9/2021. Last breast MRI done in 5/2021. Will plan to have every other year. RTC in 1 year or sooner PRN. She is comfortable with this plan. All questions answered and she stated understanding. Total time spent for this patient - 20 minutes.

## 2021-09-17 ENCOUNTER — HOSPITAL ENCOUNTER (OUTPATIENT)
Dept: MAMMOGRAPHY | Age: 66
Discharge: HOME | End: 2021-09-17
Attending: NURSE PRACTITIONER
Payer: COMMERCIAL

## 2021-09-17 DIAGNOSIS — Z12.31 ENCOUNTER FOR SCREENING MAMMOGRAM FOR BREAST CANCER: ICD-10-CM

## 2021-09-17 PROCEDURE — 77063 BREAST TOMOSYNTHESIS BI: CPT

## 2022-03-19 PROBLEM — Z91.89 AT HIGH RISK FOR BREAST CANCER: Status: ACTIVE | Noted: 2017-05-15

## 2022-03-20 PROBLEM — N60.92 ATYPICAL DUCTAL HYPERPLASIA OF LEFT BREAST: Status: ACTIVE | Noted: 2017-07-24

## 2022-07-29 ENCOUNTER — OFFICE VISIT (OUTPATIENT)
Dept: SURGERY | Age: 67
End: 2022-07-29
Payer: COMMERCIAL

## 2022-07-29 VITALS
DIASTOLIC BLOOD PRESSURE: 58 MMHG | SYSTOLIC BLOOD PRESSURE: 127 MMHG | BODY MASS INDEX: 19.53 KG/M2 | TEMPERATURE: 97.5 F | HEIGHT: 66 IN | WEIGHT: 121.5 LBS | HEART RATE: 67 BPM

## 2022-07-29 DIAGNOSIS — N60.92 ATYPICAL DUCTAL HYPERPLASIA OF LEFT BREAST: Primary | ICD-10-CM

## 2022-07-29 DIAGNOSIS — N60.12 FIBROCYSTIC BREAST CHANGES OF BOTH BREASTS: ICD-10-CM

## 2022-07-29 DIAGNOSIS — Z12.31 ENCOUNTER FOR SCREENING MAMMOGRAM FOR BREAST CANCER: ICD-10-CM

## 2022-07-29 DIAGNOSIS — Z91.89 AT HIGH RISK FOR BREAST CANCER: ICD-10-CM

## 2022-07-29 DIAGNOSIS — N60.11 FIBROCYSTIC BREAST CHANGES OF BOTH BREASTS: ICD-10-CM

## 2022-07-29 PROCEDURE — 99213 OFFICE O/P EST LOW 20 MIN: CPT | Performed by: NURSE PRACTITIONER

## 2022-07-29 PROCEDURE — 1123F ACP DISCUSS/DSCN MKR DOCD: CPT | Performed by: NURSE PRACTITIONER

## 2022-07-29 NOTE — PROGRESS NOTES
HISTORY OF PRESENT ILLNESS  Patsy Lucero is a 77 y.o. female. HPI ESTABLISHED patient here for annual follow up for history of ADH. Denies breast mass, skin changes, nipple discharge and pain. 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 Clear margins. 17: LT breast (11:00) core bx of ADH. Patient has also had numerous bilateral breast biopsies, all benign (including a right breast fibroadenoma). FH includes-  Sister diagnosed age 43 with breast cancer  at 50  Sister diagnosed with breast cancer age 54, still living (her daughter had genetic testing, which was negative). Niece - diagnosed with breast cancer at 29  3/10/17 - [address] [place] [address] - Her 10 year risk is 15.5% (compared with a population risk of 3.6%). Her lifetime risk is 34% (compared with a population risk of 8.7%)      OB History          3    Para        Term                AB        Living             SAB        IAB        Ectopic        Molar        Multiple        Live Births              Obstetric Comments   Menarche:14  . LMP: 36.  # of Children:  3. Age at Delivery of First Child:  22.   Hysterectomy/oophorectomy:  YES/NO. Breast Bx:  yes. Hx of Breast Feeding:  yes. BCP:  yes.  Hormone therapy:  no.                 Past Surgical History:   Procedure Laterality Date    HX BREAST BIOPSY Bilateral over years    several stereotactic bx all neg    HX BREAST BIOPSY Right yrs ago    neg; surgical bx    HX BREAST BIOPSY Left 6/15/2017    LEFT BREAST EXCISIONAL BIOPSY WITH NEEDLE LOCALIZATION  performed by Ladan Mcfarland[facility]    HX HYSTERECTOMY      HX OTHER SURGICAL      LEFT AXILLARY NODE REMOVED    HX TUBAL LIGATION      HI BREAST SURGERY PROCEDURE UNLISTED      fibroadadenoma RIGHT          BENNIE Results (most recent):  Results from Hospital Encounter encounter on 21    BENNIE 3D PITA W MAMMO BI SCREENING INCL CAD    Narrative  STUDY: Bilateral digital screening mammogram with 3-D tomosynthesis    INDICATION:  Screening. COMPARISON: [id number]    BREAST COMPOSITION: The breasts are extremely dense, which lowers the  sensitivity of mammography. FINDINGS: Bilateral digital screening mammography was performed and is  interpreted in conjunction with a computer assisted detection (CAD) system. Additionally, tomosynthesis of both breasts in the CC and MLO projections was  performed. No suspicious masses or calcifications are identified. There has been  no significant change. Impression  BI-RADS 1: Negative. No mammographic evidence of malignancy. RECOMMENDATIONS:  Next screening mammogram is recommended in one year. The patient has a known increased lifetime risk of breast cancer. Her last  screening breast MRI was performed in May 2021. The patient will be notified of these results. ROS    Physical Exam  Constitutional:       Appearance: Normal appearance. Chest:   Breasts:     Right: No mass, nipple discharge, skin change, tenderness, axillary adenopathy or supraclavicular adenopathy. Left: No mass, nipple discharge, skin change, tenderness, axillary adenopathy or supraclavicular adenopathy. Comments: Dense, fibrocystic breasts bilaterally   Musculoskeletal:      Comments: FROM - UE x 2   Lymphadenopathy:      Upper Body:      Right upper body: No supraclavicular or axillary adenopathy. Left upper body: No supraclavicular or axillary adenopathy. Neurological:      Mental Status: She is alert.    Psychiatric:         Attention and Perception: Attention normal.         Mood and Affect: Mood normal.         Speech: Speech normal.         Behavior: Behavior normal.       Visit Vitals  BP (!) 127/58 (BP 1 Location: Right arm, BP Patient Position: Sitting, BP Cuff Size: Small adult)   Pulse 67   Temp 97.5 °F (36.4 °C) (Oral)   Ht 5' 6\" (1.676 m)   Wt 121 lb 8 oz (55.1 kg)   BMI 19.61 kg/m²       ASSESSMENT and PLAN  Encounter Diagnoses   Name Primary? Atypical ductal hyperplasia of left breast Yes    At high risk for breast cancer     Fibrocystic breast changes of both breasts     Encounter for screening mammogram for breast cancer      Normal exam with no evidence of local malignancy. BSmammogram 3D in 9/2022. Last breast MRI done in 5/2021. Will plan to have in 4/2023 before alf/change insurance. RTC in 1 year or sooner PRN. She is comfortable with this plan. All questions answered and she stated understanding. Total time spent for this patient - 20 minutes.

## 2022-11-28 ENCOUNTER — HOSPITAL ENCOUNTER (OUTPATIENT)
Dept: MAMMOGRAPHY | Age: 67
Discharge: HOME | End: 2022-11-28
Attending: NURSE PRACTITIONER
Payer: COMMERCIAL

## 2022-11-28 DIAGNOSIS — Z12.31 ENCOUNTER FOR SCREENING MAMMOGRAM FOR BREAST CANCER: ICD-10-CM

## 2022-11-28 PROCEDURE — 77063 BREAST TOMOSYNTHESIS BI: CPT

## 2023-04-07 ENCOUNTER — HOSPITAL ENCOUNTER (OUTPATIENT)
Dept: MRI IMAGING | Age: 68
End: 2023-04-07
Attending: NURSE PRACTITIONER
Payer: COMMERCIAL

## 2023-05-22 RX ORDER — LORATADINE 10 MG/1
10 TABLET ORAL DAILY PRN
COMMUNITY

## 2023-05-22 RX ORDER — CYCLOSPORINE 0.5 MG/ML
1 EMULSION OPHTHALMIC 2 TIMES DAILY
COMMUNITY

## 2023-05-22 RX ORDER — ESTRADIOL 0.1 MG/G
2 CREAM VAGINAL DAILY
COMMUNITY

## 2023-06-29 ENCOUNTER — CLINICAL DOCUMENTATION (OUTPATIENT)
Age: 68
End: 2023-06-29

## 2023-11-08 DIAGNOSIS — Z12.31 ENCOUNTER FOR SCREENING MAMMOGRAM FOR MALIGNANT NEOPLASM OF BREAST: Primary | ICD-10-CM

## 2023-12-29 ENCOUNTER — HOSPITAL ENCOUNTER (OUTPATIENT)
Facility: HOSPITAL | Age: 68
Discharge: HOME | End: 2023-12-29
Payer: COMMERCIAL

## 2023-12-29 VITALS — HEIGHT: 66 IN | BODY MASS INDEX: 19.62 KG/M2

## 2023-12-29 DIAGNOSIS — Z12.31 ENCOUNTER FOR SCREENING MAMMOGRAM FOR MALIGNANT NEOPLASM OF BREAST: ICD-10-CM

## 2023-12-29 PROCEDURE — 77063 BREAST TOMOSYNTHESIS BI: CPT

## 2024-05-17 ENCOUNTER — TELEPHONE (OUTPATIENT)
Age: 69
End: 2024-05-17

## 2024-05-17 NOTE — TELEPHONE ENCOUNTER
Patient left a message in the [facility] requesting appointment w/Jessica, Np. Called and left message for patient to call me back to schedule appointment.

## 2024-05-20 ENCOUNTER — HOSPITAL ENCOUNTER (OUTPATIENT)
Facility: HOSPITAL | Age: 69
Setting detail: RECURRING SERIES
Discharge: HOME | End: 2024-05-23
Payer: COMMERCIAL

## 2024-05-20 PROCEDURE — 97162 PT EVAL MOD COMPLEX 30 MIN: CPT

## 2024-05-20 PROCEDURE — 97112 NEUROMUSCULAR REEDUCATION: CPT

## 2024-05-20 PROCEDURE — 97535 SELF CARE MNGMENT TRAINING: CPT

## 2024-05-20 NOTE — THERAPY EVALUATION
Physical Therapy at [place],   a part of [address], [address]  [place] [address]  Phone: [phone]  Fax: [phone]       PHYSICAL THERAPY - EVALUATION/PLAN OF CARE NOTE (updated 3/23)      Date: 2024          Patient Name:  Hallie Giron :  1955   Medical   Diagnosis:  Urinary incontinence [R32] Treatment Diagnosis:  M62.89  OTHER SPECIFIED DISORDERS OF MUSCLE    Referral Source:  Shahnaz Beasley MD Provider #:  [id number]                Insurance: Payor: AETNA / Plan: AETNA / Product Type: *No Product type* /      Patient  verified yes     Visit #   Current  / Total 1 12   Time   In / Out 115 215   Total Treatment Time 60   Total Timed Codes 30         SUBJECTIVE  Pain Level (0-10 scale): 0  []constant []intermittent []improving []worsening []no change since onset    Any medication changes, allergies to medications, adverse drug reactions, diagnosis change, or new procedure performed?: [x] No    [] Yes (see summary sheet for update)  Medications: Verified on Patient Summary List    Subjective functional status/changes:     Patient reports that this has been going on for 10+ years.  Current symptoms include voiding every hour during the day.  Waking 1-3x/night to void.  Limiting water at end of the day.  She drinks 1-2 cups of coffee day, drinking 6-8 glasses.  Not leaking. Performs just in case voiding.  Does not perform urge suppression, will have back pain if she holds too long.  Daily BM 2x/day.    Start of Care: 2024  Onset Date: see above  Current symptoms/Complaints: see above  Mechanism of Injury: gradual  PLOF: stretching, 3 mile walks, working in garden , baking [employer]  Limitations to PLOF/Activity or Recreational Limitations: wfl  Work Hx: retired NP (pediatrics).  Living Situation: living with spouse  Mobility: wfl  Self Care: wfl  Previous Treatment/Compliance: wfl  PMHx/Surgical Hx/Comorbidites: 3

## 2024-06-20 ENCOUNTER — HOSPITAL ENCOUNTER (OUTPATIENT)
Facility: HOSPITAL | Age: 69
Setting detail: RECURRING SERIES
Discharge: HOME | End: 2024-06-23
Payer: COMMERCIAL

## 2024-06-20 PROCEDURE — 97112 NEUROMUSCULAR REEDUCATION: CPT

## 2024-06-20 NOTE — PROGRESS NOTES
PHYSICAL THERAPY - DAILY TREATMENT NOTE (updated 3/23)      Date: 2024          Patient Name:  Hallie Giron :  1955   Medical   Diagnosis:  Urinary incontinence [R32] Treatment Diagnosis:  M62.89  OTHER SPECIFIED DISORDERS OF MUSCLE    Referral Source:  Shahnaz Beasley MD Insurance:   Payor: AETNA / Plan: AETNA / Product Type: *No Product type* /                     Patient  verified yes     Visit #   Current  / Total 2 12   Time   In / Out 1015 1045   Total Treatment Time 30   Total Timed Codes 30         SUBJECTIVE    Pain Level (0-10 scale): 0    Any medication changes, allergies to medications, adverse drug reactions, diagnosis change, or new procedure performed?: [x] No    [] Yes (see summary sheet for update)  Medications: Verified on Patient Summary List    Subjective functional status/changes:     Going every 3 hours during the day.  Making it 6 hours during the night before waking to void.  Not experiencing extreme urge on the way to the bathroom.    OBJECTIVE      Therapeutic Procedures:  Tx Min Billable or 1:1 Min (if diff from Tx Min) Procedure, Rationale, Specifics   30  [id number] Neuromuscular Re-Education (timed):  improve balance, coordination, kinesthetic sense, posture, core stability and proprioception to improve patient's ability to develop conscious control of individual muscles and awareness of position of extremities in order to progress to PLOF and address remaining functional goals. (see flow sheet as applicable)     Details if applicable:     30     Total Total             [x]  Patient Education billed concurrently with other procedures   [x] Review HEP    [] Progressed/Changed HEP, detail:    [] Other detail:         Other Objective/Functional Measures  Reviewed ZOA and pelvic protection strategies in standing and with lift/push/pull for POP prevention    Pain Level at end of session (0-10 scale): 0      Assessment     Patient will continue to benefit from skilled PT / OT

## 2024-07-25 NOTE — PROGRESS NOTES
recent):  SHIKHA PAWAN DIGITAL SCREEN BILATERAL 12/29/2023    Narrative  STUDY: Bilateral digital screening mammogram with 3-D tomosynthesis    INDICATION:  Screening.    COMPARISON: [id number]    BREAST COMPOSITION: The breasts are extremely dense, which lowers the  sensitivity of mammography.    FINDINGS: Bilateral digital screening mammography was performed and is  interpreted in conjunction with a computer assisted detection (CAD) system.  Additionally, tomosynthesis of both breasts in the CC and MLO projections was  performed. There are numerous biopsy clips. Postsurgical changes in the medial,  left breast. Benign-appearing calcifications. No suspicious masses or  calcifications are identified. There has been no significant change.    Impression  BI-RADS 2: Benign. No mammographic evidence of malignancy.    RECOMMENDATIONS:  Next screening mammogram is recommended in one year.    The patient will be notified of these results.          MRI Result (most recent):  MRI BREAST BILATERAL W WO CONTRAST 04/07/2023    Narrative  This is a summary report. The complete report is available in the patient's medical record. If you cannot access the medical record, please contact the sending organization for a detailed fax or copy.    EXAM:  MRI BREAST BI W WO CONT    INDICATION: High-risk screening. Two sisters with breast carcinoma.    COMPARISON: MRI breast on 5/24/2021 and 5/10/2019. Mammography on 11/20/2022 and  9/17/2021.    EXAM: MRI of right and left breast without and with IV contrast Gadolinium .    TECHNIQUE: MRI breast without and with IV contrast. Bilateral breast MRI was  performed using a dedicated breast coil without compression with the patient in  the prone position. Precontrast T1-weighted images with fat suppression were  obtained followed by bolus injection of 5 mL of Gadavist . Postcontrast dynamic  and high-resolution images were acquired. Axial inversion recovery imaging was  also performed. The images

## 2024-07-26 ENCOUNTER — OFFICE VISIT (OUTPATIENT)
Age: 69
End: 2024-07-26

## 2024-07-26 VITALS — HEIGHT: 66 IN | BODY MASS INDEX: 19.44 KG/M2 | WEIGHT: 121 LBS

## 2024-07-26 DIAGNOSIS — Z80.3 FAMILY HISTORY OF BREAST CANCER: ICD-10-CM

## 2024-07-26 DIAGNOSIS — N63.11 MASS OF UPPER OUTER QUADRANT OF RIGHT BREAST: Primary | ICD-10-CM

## 2024-07-26 DIAGNOSIS — Z12.31 ENCOUNTER FOR SCREENING MAMMOGRAM FOR MALIGNANT NEOPLASM OF BREAST: ICD-10-CM

## 2024-08-08 ENCOUNTER — HOSPITAL ENCOUNTER (OUTPATIENT)
Facility: HOSPITAL | Age: 69
Discharge: HOME | End: 2024-08-08
Payer: COMMERCIAL

## 2024-08-08 ENCOUNTER — HOSPITAL ENCOUNTER (OUTPATIENT)
Facility: HOSPITAL | Age: 69
End: 2024-08-08
Payer: COMMERCIAL

## 2024-08-08 DIAGNOSIS — N63.11 MASS OF UPPER OUTER QUADRANT OF RIGHT BREAST: ICD-10-CM

## 2024-08-08 PROCEDURE — 76642 ULTRASOUND BREAST LIMITED: CPT

## 2024-08-08 PROCEDURE — G0279 TOMOSYNTHESIS, MAMMO: HCPCS

## 2024-11-14 ENCOUNTER — HOSPITAL ENCOUNTER (OUTPATIENT)
Facility: HOSPITAL | Age: 69
Discharge: HOME | End: 2024-11-16
Payer: COMMERCIAL

## 2024-11-14 VITALS
BODY MASS INDEX: 19.44 KG/M2 | DIASTOLIC BLOOD PRESSURE: 77 MMHG | HEIGHT: 66 IN | SYSTOLIC BLOOD PRESSURE: 155 MMHG | WEIGHT: 121 LBS

## 2024-11-14 DIAGNOSIS — I49.9 IRREGULAR HEARTBEAT: ICD-10-CM

## 2024-11-14 PROCEDURE — 93306 TTE W/DOPPLER COMPLETE: CPT

## 2024-11-15 LAB
ECHO AO ARCH DIAM: 2 CM
ECHO AO ASC DIAM: 2.8 CM
ECHO AO ASCENDING AORTA INDEX: 1.73 CM/M2
ECHO AO ROOT DIAM: 3.1 CM
ECHO AO ROOT INDEX: 1.91 CM/M2
ECHO AV AREA PEAK VELOCITY: 2 CM2
ECHO AV AREA PEAK VELOCITY: 2.3 CM2
ECHO AV AREA VTI: 2.3 CM2
ECHO AV AREA/BSA VTI: 1.4 CM2/M2
ECHO AV MEAN GRADIENT: 3 MMHG
ECHO AV MEAN VELOCITY: 0.7 M/S
ECHO AV PEAK GRADIENT: 5 MMHG
ECHO AV PEAK GRADIENT: 6 MMHG
ECHO AV PEAK VELOCITY: 1.1 M/S
ECHO AV PEAK VELOCITY: 1.2 M/S
ECHO AV VTI: 27.3 CM
ECHO BSA: 1.6 M2
ECHO LA DIAMETER INDEX: 1.79 CM/M2
ECHO LA DIAMETER: 2.9 CM
ECHO LA TO AORTIC ROOT RATIO: 0.94
ECHO LA VOL A-L A2C: 26 ML (ref 22–52)
ECHO LA VOL A-L A4C: 15 ML (ref 22–52)
ECHO LA VOL BP: 19 ML (ref 22–52)
ECHO LA VOL MOD A2C: 23 ML (ref 22–52)
ECHO LA VOL MOD A4C: 13 ML (ref 22–52)
ECHO LA VOL/BSA BIPLANE: 12 ML/M2 (ref 16–34)
ECHO LA VOLUME AREA LENGTH: 22 ML
ECHO LA VOLUME INDEX A-L A2C: 16 ML/M2 (ref 16–34)
ECHO LA VOLUME INDEX A-L A4C: 9 ML/M2 (ref 16–34)
ECHO LA VOLUME INDEX AREA LENGTH: 14 ML/M2 (ref 16–34)
ECHO LA VOLUME INDEX MOD A2C: 14 ML/M2 (ref 16–34)
ECHO LA VOLUME INDEX MOD A4C: 8 ML/M2 (ref 16–34)
ECHO LV E' LATERAL VELOCITY: 8.6 CM/S
ECHO LV E' SEPTAL VELOCITY: 8.9 CM/S
ECHO LV EDV A2C: 69 ML
ECHO LV EDV A4C: 55 ML
ECHO LV EDV BP: 64 ML (ref 56–104)
ECHO LV EDV INDEX A4C: 34 ML/M2
ECHO LV EDV INDEX BP: 40 ML/M2
ECHO LV EDV NDEX A2C: 43 ML/M2
ECHO LV EJECTION FRACTION A2C: 68 %
ECHO LV EJECTION FRACTION A4C: 59 %
ECHO LV EJECTION FRACTION BIPLANE: 64 % (ref 55–100)
ECHO LV ESV A2C: 22 ML
ECHO LV ESV A4C: 23 ML
ECHO LV ESV BP: 23 ML (ref 19–49)
ECHO LV ESV INDEX A2C: 14 ML/M2
ECHO LV ESV INDEX A4C: 14 ML/M2
ECHO LV ESV INDEX BP: 14 ML/M2
ECHO LV FRACTIONAL SHORTENING: 29 % (ref 28–44)
ECHO LV INTERNAL DIMENSION DIASTOLE INDEX: 2.78 CM/M2
ECHO LV INTERNAL DIMENSION DIASTOLIC: 4.5 CM (ref 3.9–5.3)
ECHO LV INTERNAL DIMENSION SYSTOLIC INDEX: 1.98 CM/M2
ECHO LV INTERNAL DIMENSION SYSTOLIC: 3.2 CM
ECHO LV IVSD: 0.7 CM (ref 0.6–0.9)
ECHO LV MASS 2D: 95.7 G (ref 67–162)
ECHO LV MASS INDEX 2D: 59 G/M2 (ref 43–95)
ECHO LV POSTERIOR WALL DIASTOLIC: 0.7 CM (ref 0.6–0.9)
ECHO LV RELATIVE WALL THICKNESS RATIO: 0.31
ECHO LVOT AREA: 3.5 CM2
ECHO LVOT AV VTI INDEX: 0.65
ECHO LVOT DIAM: 2.1 CM
ECHO LVOT MEAN GRADIENT: 1 MMHG
ECHO LVOT PEAK GRADIENT: 2 MMHG
ECHO LVOT PEAK VELOCITY: 0.7 M/S
ECHO LVOT STROKE VOLUME INDEX: 38 ML/M2
ECHO LVOT SV: 61.6 ML
ECHO LVOT VTI: 17.8 CM
ECHO MV A VELOCITY: 0.45 M/S
ECHO MV E DECELERATION TIME (DT): 194.5 MS
ECHO MV E VELOCITY: 0.89 M/S
ECHO MV E/A RATIO: 1.98
ECHO MV E/E' LATERAL: 10.35
ECHO MV E/E' RATIO (AVERAGED): 10.17
ECHO MV E/E' SEPTAL: 10
ECHO MV REGURGITANT PEAK GRADIENT: 139 MMHG
ECHO MV REGURGITANT PEAK VELOCITY: 5.9 M/S
ECHO PULMONARY ARTERY END DIASTOLIC PRESSURE: 1 MMHG
ECHO PV MAX VELOCITY: 0.8 M/S
ECHO PV PEAK GRADIENT: 2 MMHG
ECHO PV REGURGITANT MAX VELOCITY: 0.6 M/S
ECHO RV FREE WALL PEAK S': 10.3 CM/S
ECHO RV INTERNAL DIMENSION: 3.6 CM
ECHO RV TAPSE: 2 CM (ref 1.7–?)
ECHO TV REGURGITANT MAX VELOCITY: 2.43 M/S
ECHO TV REGURGITANT PEAK GRADIENT: 24 MMHG

## 2024-12-06 ENCOUNTER — OFFICE VISIT (OUTPATIENT)
Age: 69
End: 2024-12-06
Payer: COMMERCIAL

## 2024-12-06 ENCOUNTER — TELEPHONE (OUTPATIENT)
Age: 69
End: 2024-12-06

## 2024-12-06 VITALS
BODY MASS INDEX: 19.13 KG/M2 | SYSTOLIC BLOOD PRESSURE: 130 MMHG | HEART RATE: 71 BPM | HEIGHT: 66 IN | DIASTOLIC BLOOD PRESSURE: 80 MMHG | OXYGEN SATURATION: 98 % | WEIGHT: 119 LBS

## 2024-12-06 DIAGNOSIS — I49.9 IRREGULAR HEARTBEAT: Primary | ICD-10-CM

## 2024-12-06 DIAGNOSIS — I49.9 IRREGULAR HEARTBEAT: ICD-10-CM

## 2024-12-06 DIAGNOSIS — Z13.220 SCREENING CHOLESTEROL LEVEL: Primary | ICD-10-CM

## 2024-12-06 PROCEDURE — 99204 OFFICE O/P NEW MOD 45 MIN: CPT | Performed by: INTERNAL MEDICINE

## 2024-12-06 PROCEDURE — 1123F ACP DISCUSS/DSCN MKR DOCD: CPT | Performed by: INTERNAL MEDICINE

## 2024-12-06 PROCEDURE — 93000 ELECTROCARDIOGRAM COMPLETE: CPT | Performed by: INTERNAL MEDICINE

## 2024-12-06 RX ORDER — LIFITEGRAST 50 MG/ML
1 SOLUTION/ DROPS OPHTHALMIC 2 TIMES DAILY
COMMUNITY
Start: 2019-01-01

## 2024-12-06 RX ORDER — METOPROLOL TARTRATE 25 MG/1
25 TABLET, FILM COATED ORAL 2 TIMES DAILY
COMMUNITY
Start: 2024-11-22

## 2024-12-06 RX ORDER — COVID-19 ANTIGEN TEST
220 KIT MISCELLANEOUS AS NEEDED
COMMUNITY
Start: 2024-06-01

## 2024-12-06 NOTE — PROGRESS NOTES
Ryan Mart MD., [facility]    [phone],[facility],[place] [address]    Office [phone],Fax [phone]           Hallie Giron is a 69 y.o. female referred for evaluation of palpitation. Consult requested by Chyna Overton APRN - NP    CC - as documented in EMR    Dear Ms. Chyna Overton APRN - NP    I had the pleasure of seeing Ms.Debra BONY Giron in the office today.      Assessment:   Palpitations/dizziness/PSVT- transient A-fib on monitor  PVCs  Hypercholesterolemia-currently not on medications.  Probably has familial hypercholesterolemia       Plan:      Lipid/CMP  14-day event monitor-on metoprolol  Coronary CTA  EP referral-Dr. Ayala  Follow-up 4 to 6 weeks/earlier as needed      Patient understands the plan. All questions were answered to the patient's satisfaction.    I appreciate the opportunity to be involved in . See note below for details. Please do not hesitate to contact us   with questions or concerns.    Ryan Mart MD      Cardiac Testing/ Procedures:       A.Cardiac Cath/PCI:    B.ECHO/FRANDY:    C.StressNuclear/Stress ECHO/Stress test:    D.Vascular:    E. EP:    F. Miscellaneous: Event monitor-2 weeks-10/18/2024 to 11/1/2024  Minimum heart rate 45 bpm, maximum heart rate 200 bpm, average heart rate 66 bpm episodes of SVT-fastest heart rate 200 bpm PACs less than 1%, PVCs less than 1%    History:     Hallie Giron is a 69 y.o. female who is referred for evaluation of palpitations.    History of dizziness/intermittent palpitations since October 2024.  Had a event monitor placed and was noted to have episodes of SVT.  Was started on metoprolol 12.5 mg twice daily.  Palpitations has improved in her feet.  Shows less episodes of fast heart rate.  However, has noticed sometimes heart rate in the 30s but average heart rate usually in the 50s.  No syncope.  No chest pain/dyspnea/swelling lower extremities.    Family history of

## 2024-12-06 NOTE — PROGRESS NOTES
had concerns including New Patient.    Vitals:    12/06/24 1536   BP: 130/80   Site: Left Upper Arm   Position: Sitting   Pulse: 71   SpO2: 98%   Weight: 54 kg (119 lb)   Height: 1.676 m (5' 6\")        Chest pain No    Refills No        1. Have you been to the ER, urgent care clinic since your last visit? No       Hospitalized since your last visit? No       Where?        Date?

## 2024-12-09 NOTE — TELEPHONE ENCOUNTER
Enrolled with [employer] - Ordered and being shipped to patient's home address on file.  ETA within 5-7 business days.

## 2024-12-30 ENCOUNTER — CLINICAL DOCUMENTATION (OUTPATIENT)
Age: 69
End: 2024-12-30

## 2024-12-30 ENCOUNTER — HOSPITAL ENCOUNTER (OUTPATIENT)
Facility: HOSPITAL | Age: 69
Discharge: HOME | End: 2025-01-02
Payer: COMMERCIAL

## 2024-12-30 VITALS — WEIGHT: 119 LBS | BODY MASS INDEX: 19.21 KG/M2

## 2024-12-30 DIAGNOSIS — Z12.31 ENCOUNTER FOR SCREENING MAMMOGRAM FOR MALIGNANT NEOPLASM OF BREAST: ICD-10-CM

## 2024-12-30 PROCEDURE — 77063 BREAST TOMOSYNTHESIS BI: CPT

## 2025-01-28 ENCOUNTER — TELEPHONE (OUTPATIENT)
Age: 70
End: 2025-01-28

## 2025-01-30 ENCOUNTER — OFFICE VISIT (OUTPATIENT)
Age: 70
End: 2025-01-30
Payer: COMMERCIAL

## 2025-01-30 VITALS
SYSTOLIC BLOOD PRESSURE: 116 MMHG | DIASTOLIC BLOOD PRESSURE: 78 MMHG | HEART RATE: 58 BPM | WEIGHT: 123 LBS | OXYGEN SATURATION: 98 % | HEIGHT: 66 IN | BODY MASS INDEX: 19.77 KG/M2

## 2025-01-30 DIAGNOSIS — I47.10 PSVT (PAROXYSMAL SUPRAVENTRICULAR TACHYCARDIA) (HCC): Primary | ICD-10-CM

## 2025-01-30 PROCEDURE — 1123F ACP DISCUSS/DSCN MKR DOCD: CPT | Performed by: INTERNAL MEDICINE

## 2025-01-30 PROCEDURE — 99213 OFFICE O/P EST LOW 20 MIN: CPT | Performed by: INTERNAL MEDICINE

## 2025-01-30 NOTE — PROGRESS NOTES
Ryan Mart MD., [facility]    [address],[facility],[place] [address]    Office [phone],Fax [phone]           Halile Giron is a 69 y.o. female - fup visit    CC - as documented in EMR    Dear MsJami Overton Chyna, AMAYA - NP    I had the pleasure of seeing Ms.Debra BONY Giron in the office today.      Assessment:   Hx of Palpitations/PSVT  PVC  Hypercholesterolemia-currently not on medications.  Probably has familial hypercholesterolemia       Plan:        Coronary CTA- pending  12/13/24 - ; ; HDL 78 . Will consider statin based on cor cta findings  EP referral-Dr. Ayala  Cannot tolerate inc dose of metoprolol  Follow-up 4 to 6 months/earlier as needed      Patient understands the plan. All questions were answered to the patient's satisfaction.    I appreciate the opportunity to be involved in . See note below for details. Please do not hesitate to contact us   with questions or concerns.    Ryan Mart MD      Cardiac Testing/ Procedures:       A.Cardiac Cath/PCI:    B.ECHO/FRANDY: 11/14/24   Left Ventricle: Normal left ventricular systolic function with a visually estimated EF of 60 - 65%. Left ventricle size is normal. Normal wall thickness. Normal wall motion. Normal diastolic function.    Tricuspid Valve: Mild regurgitation.    Image quality is good    C.StressNuclear/Stress ECHO/Stress test:    D.Vascular:    E. EP:12/12/24 - 12/25/24     *The predominant rhythm was Sinus Rhythm. *The Maximum Heart Rate recorded was 116 BPM, 02:32 PM 12/22, the Minimum Heart Rate recorded was 45 BPM, 12:57 AM 12/21 and the Average Heart Rate was 65 BPM. *There were 446 VE beats with a burden of <1 %. *There were 5,104 SVE beats with a burden of <1 %. *There were 0 manually detected events.      No sig arrhythmias    F. Miscellaneous: Event monitor-2 weeks-10/18/2024 to 11/1/2024  Minimum heart rate 45 bpm, maximum heart rate 200 bpm, average heart rate 66 bpm

## 2025-01-30 NOTE — PROGRESS NOTES
Chief Complaint   Patient presents with    Palpitations    Dizziness    PVC'S    ^ CHOL     Vitals:    01/30/25 1318   BP: 116/78   Site: Left Upper Arm   Position: Sitting   Pulse: 58   SpO2: 98%   Weight: 55.8 kg (123 lb)   Height: 1.674 m (5' 5.9\")       Chest pain NO     ER, urgent care, or hospitalized outside of [facility] since your last visit?  NO     Refills NO

## 2025-02-10 RX ORDER — IOPAMIDOL 755 MG/ML
100 INJECTION, SOLUTION INTRAVASCULAR
Status: COMPLETED | OUTPATIENT
Start: 2025-02-10 | End: 2025-02-11

## 2025-02-11 ENCOUNTER — HOSPITAL ENCOUNTER (OUTPATIENT)
Facility: HOSPITAL | Age: 70
Discharge: HOME | End: 2025-02-14
Attending: INTERNAL MEDICINE
Payer: COMMERCIAL

## 2025-02-11 VITALS
OXYGEN SATURATION: 98 % | SYSTOLIC BLOOD PRESSURE: 168 MMHG | HEART RATE: 62 BPM | DIASTOLIC BLOOD PRESSURE: 85 MMHG | RESPIRATION RATE: 18 BRPM

## 2025-02-11 DIAGNOSIS — I49.9 IRREGULAR HEARTBEAT: ICD-10-CM

## 2025-02-11 LAB — CREAT BLD-MCNC: 1 MG/DL (ref 0.6–1.3)

## 2025-02-11 PROCEDURE — 75574 CT ANGIO HRT W/3D IMAGE: CPT

## 2025-02-11 PROCEDURE — 82565 ASSAY OF CREATININE: CPT

## 2025-02-11 PROCEDURE — 6360000004 HC RX CONTRAST MEDICATION: Performed by: INTERNAL MEDICINE

## 2025-02-11 PROCEDURE — 6370000000 HC RX 637 (ALT 250 FOR IP): Performed by: INTERNAL MEDICINE

## 2025-02-11 RX ORDER — NITROGLYCERIN 0.4 MG/1
0.8 TABLET SUBLINGUAL ONCE
Status: COMPLETED | OUTPATIENT
Start: 2025-02-11 | End: 2025-02-11

## 2025-02-11 RX ADMIN — IOPAMIDOL 80 ML: 755 INJECTION, SOLUTION INTRAVENOUS at 09:05

## 2025-02-11 RX ADMIN — NITROGLYCERIN 0.8 MG: 0.4 TABLET SUBLINGUAL at 08:59

## 2025-02-11 NOTE — PROGRESS NOTES
0810  Obtained patient from [facility] for CT Coronary. Patient ambulated back to radiology holding. Verified name and . Patient is alert and oriented. Allergies reviewed. Patient has no complaints of pain. Vitals taken. HR 58 /83 O2 98% on room air. RAC 20G PIV placed with blood return noted. Istat drawn and taken to CT.  0855  CT ready for patient. Administered Nitro 0.8 mg SL per CT Coronary protocol. Patient ambulated to CT and positioned onto CT table supine. Attached to CT ECG and contrast injector.  0920  Patient tolerated procedure well with no adverse reaction from contrast. No complaints of feeling dizzy or lightheaded. Vitals taken. HR 62 /85 O2 98% on room air. RAC PIV removed with no complications. Educated patient to drink lots of fluids today. Patient verbalized understanding. Patient ambulated back out to [facility].

## 2025-02-13 ENCOUNTER — CLINICAL DOCUMENTATION (OUTPATIENT)
Age: 70
End: 2025-02-13

## 2025-04-01 PROBLEM — I47.10 SVT (SUPRAVENTRICULAR TACHYCARDIA): Status: ACTIVE | Noted: 2025-04-01

## 2025-04-01 NOTE — PROGRESS NOTES
Cardiac Electrophysiology OFFICE Consultation Note       Assessment/Plan:   1. SVT (supraventricular tachycardia)  -     EKG 12 Lead       Primary Cardiologist: Barron      SVT/palpitations  History of rapid heart rate up to greater than 170 bpm.  Prior monitor with no primary data available to me demonstrated SVT up to 200 bpm.  Has been well-controlled metoprolol.  - Repeat event monitor from 12/12/2024 to 12/25/2024 demonstrated average heart rate 65 bpm ranging between 45 to 116 bpm.  No A-fib noted.  PAC/PVC burden less than 1%.  No SVT, pauses noted.   --Cardiac CT scan demonstrated calcium score of 0.  - She is well-controlled on metoprolol at this time.  No clear recurrent SVT or evidence of atrial fibrillation  - Advised to obtain [employer] mobile or smart watch to document recurrent episodes to help guide diagnostic procedure with future treatments  - cont home Metoprolol  - Follow-up with EP in 1 year  - Might benefit from EP study possible SVT ablation if she has significant recurrent SVT        Subjective:       Hallie Giron is a 69 y.o. patient who is seen for evaluation of  SVT.    History of rapid HR >170 bpm.  Prior monitor in 10/24, SVT noted with HR up to 200 bpm.  Repeat event monitor from 12/12/2024 to 12/25/2024 demonstrated average heart rate 65 bpm ranging between 45 to 116 bpm.  No A-fib noted.  PAC/PVC burden less than 1%.  No SVT, pauses noted. Cardiac CT scan demonstrated calcium score of 0.  She has been doing well without any significant recurrent palpitations.  No known diagnosis of atrial fibrillation.  Tolerating metoprolol well.    Retired pediatric NP.    I independently review internal and external records of most recent labs, EKGs, event monitors or Holters, and imaging including most recent echocardiograms and stress test.  Ordered follow up testing as indicated in orders, patient instructions. Previous notes from consultants and PCP are reviewed as well as pertinent

## 2025-04-02 ENCOUNTER — OFFICE VISIT (OUTPATIENT)
Age: 70
End: 2025-04-02
Payer: COMMERCIAL

## 2025-04-02 VITALS
WEIGHT: 125 LBS | SYSTOLIC BLOOD PRESSURE: 150 MMHG | BODY MASS INDEX: 18.51 KG/M2 | DIASTOLIC BLOOD PRESSURE: 88 MMHG | OXYGEN SATURATION: 96 % | HEIGHT: 69 IN | HEART RATE: 54 BPM

## 2025-04-02 DIAGNOSIS — I47.10 SVT (SUPRAVENTRICULAR TACHYCARDIA): Primary | ICD-10-CM

## 2025-04-02 PROCEDURE — 99204 OFFICE O/P NEW MOD 45 MIN: CPT | Performed by: INTERNAL MEDICINE

## 2025-04-02 PROCEDURE — 1123F ACP DISCUSS/DSCN MKR DOCD: CPT | Performed by: INTERNAL MEDICINE

## 2025-04-02 PROCEDURE — 93000 ELECTROCARDIOGRAM COMPLETE: CPT | Performed by: INTERNAL MEDICINE

## 2025-04-02 NOTE — PROGRESS NOTES
Chief Complaint   Patient presents with    New Patient    Tachycardia     Vitals:    04/02/25 1446 04/02/25 1508   BP: (!) 150/88 (!) 150/88   BP Site: Left Upper Arm    Patient Position: Sitting    BP Cuff Size: Medium Adult    Pulse: 54    SpO2: 96%    Weight: 56.7 kg (125 lb)    Height: 1.753 m (5' 9\")       BP (!) 150/88   Pulse 54   Ht 1.753 m (5' 9\")   Wt 56.7 kg (125 lb)   SpO2 96%   BMI 18.46 kg/m²

## 2025-04-02 NOTE — PATIENT INSTRUCTIONS
Consider getting a [employer] mobile gordo or a smart watch with EKG capability    FU with Dr. Ayala in 1 year        _____________________________________  Learning about atrial fibrillation/atrial flutter will help you understand your condition and treatment options. For more information regarding atrial fibrillation management, please visit:        [email or web address]    3. Below are additional QR codes you can use to learn more as you prepare for your EP appointment.

## 2025-07-25 ENCOUNTER — OFFICE VISIT (OUTPATIENT)
Age: 70
End: 2025-07-25
Payer: MEDICARE

## 2025-07-25 VITALS — WEIGHT: 124 LBS | BODY MASS INDEX: 19.93 KG/M2 | HEIGHT: 66 IN

## 2025-07-25 DIAGNOSIS — N60.11 FIBROCYSTIC BREAST CHANGES OF BOTH BREASTS: Primary | ICD-10-CM

## 2025-07-25 DIAGNOSIS — Z80.3 FAMILY HISTORY OF BREAST CANCER: ICD-10-CM

## 2025-07-25 DIAGNOSIS — N60.12 FIBROCYSTIC BREAST CHANGES OF BOTH BREASTS: Primary | ICD-10-CM

## 2025-07-25 DIAGNOSIS — Z12.31 ENCOUNTER FOR SCREENING MAMMOGRAM FOR MALIGNANT NEOPLASM OF BREAST: ICD-10-CM

## 2025-07-25 PROCEDURE — G8420 CALC BMI NORM PARAMETERS: HCPCS | Performed by: NURSE PRACTITIONER

## 2025-07-25 PROCEDURE — 1090F PRES/ABSN URINE INCON ASSESS: CPT | Performed by: NURSE PRACTITIONER

## 2025-07-25 PROCEDURE — 99213 OFFICE O/P EST LOW 20 MIN: CPT | Performed by: NURSE PRACTITIONER

## 2025-07-25 PROCEDURE — 3017F COLORECTAL CA SCREEN DOC REV: CPT | Performed by: NURSE PRACTITIONER

## 2025-07-25 PROCEDURE — 1123F ACP DISCUSS/DSCN MKR DOCD: CPT | Performed by: NURSE PRACTITIONER

## 2025-07-25 PROCEDURE — G8399 PT W/DXA RESULTS DOCUMENT: HCPCS | Performed by: NURSE PRACTITIONER

## 2025-07-25 PROCEDURE — 1036F TOBACCO NON-USER: CPT | Performed by: NURSE PRACTITIONER

## 2025-07-25 PROCEDURE — 1159F MED LIST DOCD IN RCRD: CPT | Performed by: NURSE PRACTITIONER

## 2025-07-25 PROCEDURE — 1160F RVW MEDS BY RX/DR IN RCRD: CPT | Performed by: NURSE PRACTITIONER

## 2025-07-25 PROCEDURE — G8427 DOCREV CUR MEDS BY ELIG CLIN: HCPCS | Performed by: NURSE PRACTITIONER

## 2025-07-25 NOTE — PROGRESS NOTES
HISTORY OF PRESENT ILLNESS  Hallie Giron is a 69 y.o. female     HPI  STABLISHED patient here for annual follow up for history of ADH. Denies breast mass, skin changes, nipple discharge and pain.        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 Clear margins.  17: LT breast (11:00) core bx of ADH.  Patient has also had numerous bilateral breast biopsies, all benign (including a right breast fibroadenoma).           Family history -   Sister diagnosed age 42 with breast cancer  at 48  Sister diagnosed with breast cancer age 55, still living (her daughter had genetic testing, which was negative).  Niece - diagnosed with breast cancer at 34  3/10/17 - Teofilo Funesck model - Her 10 year risk is 15.5% (compared with a population risk of 3.6%). Her lifetime risk is 34% (compared with a population risk of 8.7%)      OB History          3    Para   3    Term   3            AB        Living             SAB        IAB        Ectopic        Molar        Multiple        Live Births   3          Obstetric Comments   Menarche:14  .   LMP: 36.  # of Children:  3.  Age at Delivery of First Child:  25.   Hysterectomy/oophorectomy:  YES/NO.  Breast Bx:  yes.  Hx of Breast Feeding:  yes.  BCP:  yes. Hormone therapy:  no.                   Past Surgical History:   Procedure Laterality Date    BREAST BIOPSY Left 6/15/2017    LEFT BREAST EXCISIONAL BIOPSY WITH NEEDLE LOCALIZATION  performed by Francisco Grimes Jr., MD at [facility] AM[facility]UL[facility]ORY OR    BREAST BIOPSY Right yrs ago    neg; surgical bx    BREAST BIOPSY Bilateral over years    several stereotactic bx all neg    BREAST SURGERY  1975    fibroadadenoma RIGHT     HYSTERECTOMY (CERVIX STATUS UNKNOWN)      OTHER SURGICAL HISTORY      LEFT AXILLARY NODE REMOVED    TUBAL LIGATION             Mammogram Result (most recent):  [facility] PAWAN DIGITAL SCREEN BILATERAL 2024    Narrative  STUDY:

## (undated) DEVICE — SUT SLK 2-0SH 30IN BLK --

## (undated) DEVICE — (D)SYR 10ML 1/5ML GRAD NSAF -- PKGING CHANGE USE ITEM 338027

## (undated) DEVICE — STERILE POLYISOPRENE POWDER-FREE SURGICAL GLOVES: Brand: PROTEXIS

## (undated) DEVICE — Device

## (undated) DEVICE — DERMABOND SKIN ADH 0.7ML -- DERMABOND ADVANCED 12/BX

## (undated) DEVICE — (D)PREP SKN CHLRAPRP APPL 26ML -- CONVERT TO ITEM 371833

## (undated) DEVICE — DRAPE,LAPAROTOMY,T,PEDI,STERILE: Brand: MEDLINE

## (undated) DEVICE — SUTURE MCRYL SZ 4-0 L27IN ABSRB UD L19MM PS-2 1/2 CIR PRIM Y426H

## (undated) DEVICE — INFECTION CONTROL KIT SYS

## (undated) DEVICE — REM POLYHESIVE ADULT PATIENT RETURN ELECTRODE: Brand: VALLEYLAB

## (undated) DEVICE — SUTURE VCRL SZ 3-0 L27IN ABSRB UD L26MM SH 1/2 CIR J416H

## (undated) DEVICE — SOLUTION IV 1000ML 0.9% SOD CHL

## (undated) DEVICE — NEEDLE HYPO 25GA L1.5IN BVL ORIENTED ECLIPSE

## (undated) DEVICE — KENDALL SCD EXPRESS SLEEVES, KNEE LENGTH, MEDIUM: Brand: KENDALL SCD

## (undated) DEVICE — INSULATED BLADE ELECTRODE: Brand: EDGE

## (undated) DEVICE — 1010 S-DRAPE TOWEL DRAPE 10/BX: Brand: STERI-DRAPE™